# Patient Record
Sex: MALE | Race: OTHER | NOT HISPANIC OR LATINO | URBAN - METROPOLITAN AREA
[De-identification: names, ages, dates, MRNs, and addresses within clinical notes are randomized per-mention and may not be internally consistent; named-entity substitution may affect disease eponyms.]

---

## 2019-02-13 ENCOUNTER — OUTPATIENT (OUTPATIENT)
Dept: OUTPATIENT SERVICES | Facility: HOSPITAL | Age: 44
LOS: 1 days | End: 2019-02-13
Payer: COMMERCIAL

## 2019-02-13 VITALS
WEIGHT: 315 LBS | HEIGHT: 74 IN | DIASTOLIC BLOOD PRESSURE: 86 MMHG | RESPIRATION RATE: 16 BRPM | TEMPERATURE: 99 F | SYSTOLIC BLOOD PRESSURE: 155 MMHG | HEART RATE: 98 BPM | OXYGEN SATURATION: 93 %

## 2019-02-13 DIAGNOSIS — R94.39 ABNORMAL RESULT OF OTHER CARDIOVASCULAR FUNCTION STUDY: ICD-10-CM

## 2019-02-13 DIAGNOSIS — Z98.84 BARIATRIC SURGERY STATUS: Chronic | ICD-10-CM

## 2019-02-13 LAB
ANION GAP SERPL CALC-SCNC: 12 MMOL/L — SIGNIFICANT CHANGE UP (ref 5–17)
BUN SERPL-MCNC: 17 MG/DL — SIGNIFICANT CHANGE UP (ref 7–23)
CALCIUM SERPL-MCNC: 9.4 MG/DL — SIGNIFICANT CHANGE UP (ref 8.4–10.5)
CHLORIDE SERPL-SCNC: 103 MMOL/L — SIGNIFICANT CHANGE UP (ref 96–108)
CO2 SERPL-SCNC: 27 MMOL/L — SIGNIFICANT CHANGE UP (ref 22–31)
CREAT SERPL-MCNC: 0.83 MG/DL — SIGNIFICANT CHANGE UP (ref 0.5–1.3)
GLUCOSE SERPL-MCNC: 106 MG/DL — HIGH (ref 70–99)
HCT VFR BLD CALC: 41.2 % — SIGNIFICANT CHANGE UP (ref 39–50)
HGB BLD-MCNC: 14.2 G/DL — SIGNIFICANT CHANGE UP (ref 13–17)
MCHC RBC-ENTMCNC: 28.9 PG — SIGNIFICANT CHANGE UP (ref 27–34)
MCHC RBC-ENTMCNC: 34.3 GM/DL — SIGNIFICANT CHANGE UP (ref 32–36)
MCV RBC AUTO: 84.1 FL — SIGNIFICANT CHANGE UP (ref 80–100)
PLATELET # BLD AUTO: 252 K/UL — SIGNIFICANT CHANGE UP (ref 150–400)
POTASSIUM SERPL-MCNC: 4 MMOL/L — SIGNIFICANT CHANGE UP (ref 3.5–5.3)
POTASSIUM SERPL-SCNC: 4 MMOL/L — SIGNIFICANT CHANGE UP (ref 3.5–5.3)
RBC # BLD: 4.9 M/UL — SIGNIFICANT CHANGE UP (ref 4.2–5.8)
RBC # FLD: 13.5 % — SIGNIFICANT CHANGE UP (ref 10.3–14.5)
SODIUM SERPL-SCNC: 142 MMOL/L — SIGNIFICANT CHANGE UP (ref 135–145)
WBC # BLD: 10.3 K/UL — SIGNIFICANT CHANGE UP (ref 3.8–10.5)
WBC # FLD AUTO: 10.3 K/UL — SIGNIFICANT CHANGE UP (ref 3.8–10.5)

## 2019-02-13 PROCEDURE — C1887: CPT

## 2019-02-13 PROCEDURE — 93010 ELECTROCARDIOGRAM REPORT: CPT

## 2019-02-13 PROCEDURE — 93005 ELECTROCARDIOGRAM TRACING: CPT

## 2019-02-13 PROCEDURE — 80048 BASIC METABOLIC PNL TOTAL CA: CPT

## 2019-02-13 PROCEDURE — 93458 L HRT ARTERY/VENTRICLE ANGIO: CPT | Mod: 26,GC

## 2019-02-13 PROCEDURE — 85027 COMPLETE CBC AUTOMATED: CPT

## 2019-02-13 PROCEDURE — 99152 MOD SED SAME PHYS/QHP 5/>YRS: CPT

## 2019-02-13 PROCEDURE — C1894: CPT

## 2019-02-13 PROCEDURE — C1769: CPT

## 2019-02-13 PROCEDURE — 99152 MOD SED SAME PHYS/QHP 5/>YRS: CPT | Mod: GC

## 2019-02-13 PROCEDURE — 93458 L HRT ARTERY/VENTRICLE ANGIO: CPT

## 2019-02-13 NOTE — H&P CARDIOLOGY - FAMILY HISTORY
Father  Still living? Yes, Estimated age: Age Unknown  Family history of early CAD, Age at diagnosis: Age Unknown     Sibling  Still living? Unknown  Family history of MI (myocardial infarction), Age at diagnosis: Age Unknown     Uncle  Still living? No  Family history of MI (myocardial infarction), Age at diagnosis: Age Unknown

## 2019-02-13 NOTE — H&P CARDIOLOGY - PMH
Gastric bypass status for obesity  s/p Lap band  Hyperlipidemia    Hypertension    Obesity    Sleep apnea

## 2019-02-13 NOTE — H&P CARDIOLOGY - HISTORY OF PRESENT ILLNESS
This is a 42y/o  male with PMHX of HTN, Hyperlipidemia , Obesity ( Lap band 10years ago) , Sleep Apnea and strong family hx CAD. Pt Cardiologist is Dr. Alex Jhaveri and now referred for [facility] due to strong family hx of heart disease.

## 2022-08-16 NOTE — H&P CARDIOLOGY - SURGICAL SITE INCISION
no PAST MEDICAL HISTORY:  Coronary artery disease     Myocardial infarction     Pacemaker      PAST MEDICAL HISTORY:  Coronary artery disease cardiac stents X7 2004- 2008    Hydrocele     Myocardial infarction     Pacemaker medtronic serial [id number] 2011     PAST MEDICAL HISTORY:  Coronary artery disease cardiac stents X7 2004- 2008    Hydrocele     Hyperlipidemia     Hypertension     Myocardial infarction     Pacemaker medtronic serial [id number] 2011

## 2022-11-15 ENCOUNTER — INPATIENT (INPATIENT)
Facility: HOSPITAL | Age: 47
LOS: 7 days | Discharge: HOME | End: 2022-11-23
Attending: HOSPITALIST | Admitting: HOSPITALIST

## 2022-11-15 VITALS
SYSTOLIC BLOOD PRESSURE: 169 MMHG | TEMPERATURE: 99 F | HEART RATE: 109 BPM | RESPIRATION RATE: 20 BRPM | OXYGEN SATURATION: 95 % | DIASTOLIC BLOOD PRESSURE: 107 MMHG

## 2022-11-15 DIAGNOSIS — E66.01 MORBID (SEVERE) OBESITY DUE TO EXCESS CALORIES: ICD-10-CM

## 2022-11-15 DIAGNOSIS — Z29.9 ENCOUNTER FOR PROPHYLACTIC MEASURES, UNSPECIFIED: ICD-10-CM

## 2022-11-15 DIAGNOSIS — F10.10 ALCOHOL ABUSE, UNCOMPLICATED: ICD-10-CM

## 2022-11-15 DIAGNOSIS — Z98.84 BARIATRIC SURGERY STATUS: Chronic | ICD-10-CM

## 2022-11-15 DIAGNOSIS — R06.02 SHORTNESS OF BREATH: ICD-10-CM

## 2022-11-15 DIAGNOSIS — I50.21 ACUTE SYSTOLIC (CONGESTIVE) HEART FAILURE: ICD-10-CM

## 2022-11-15 DIAGNOSIS — I10 ESSENTIAL (PRIMARY) HYPERTENSION: ICD-10-CM

## 2022-11-15 DIAGNOSIS — Z91.89 OTHER SPECIFIED PERSONAL RISK FACTORS, NOT ELSEWHERE CLASSIFIED: ICD-10-CM

## 2022-11-15 PROBLEM — E66.9 OBESITY, UNSPECIFIED: Chronic | Status: ACTIVE | Noted: 2019-02-13

## 2022-11-15 PROBLEM — G47.30 SLEEP APNEA, UNSPECIFIED: Chronic | Status: ACTIVE | Noted: 2019-02-13

## 2022-11-15 PROBLEM — E78.5 HYPERLIPIDEMIA, UNSPECIFIED: Chronic | Status: ACTIVE | Noted: 2019-02-13

## 2022-11-15 LAB
ALBUMIN SERPL ELPH-MCNC: 3.8 G/DL — SIGNIFICANT CHANGE UP (ref 3.3–5)
ALP SERPL-CCNC: 96 U/L — SIGNIFICANT CHANGE UP (ref 40–120)
ALT FLD-CCNC: 18 U/L — SIGNIFICANT CHANGE UP (ref 4–41)
ANION GAP SERPL CALC-SCNC: 10 MMOL/L — SIGNIFICANT CHANGE UP (ref 7–14)
AST SERPL-CCNC: 19 U/L — SIGNIFICANT CHANGE UP (ref 4–40)
B PERT DNA SPEC QL NAA+PROBE: SIGNIFICANT CHANGE UP
B PERT+PARAPERT DNA PNL SPEC NAA+PROBE: SIGNIFICANT CHANGE UP
BASOPHILS # BLD AUTO: 0.05 K/UL — SIGNIFICANT CHANGE UP (ref 0–0.2)
BASOPHILS NFR BLD AUTO: 0.6 % — SIGNIFICANT CHANGE UP (ref 0–2)
BILIRUB SERPL-MCNC: 1.2 MG/DL — SIGNIFICANT CHANGE UP (ref 0.2–1.2)
BORDETELLA PARAPERTUSSIS (RAPRVP): SIGNIFICANT CHANGE UP
BUN SERPL-MCNC: 13 MG/DL — SIGNIFICANT CHANGE UP (ref 7–23)
C PNEUM DNA SPEC QL NAA+PROBE: SIGNIFICANT CHANGE UP
CALCIUM SERPL-MCNC: 9.1 MG/DL — SIGNIFICANT CHANGE UP (ref 8.4–10.5)
CHLORIDE SERPL-SCNC: 107 MMOL/L — SIGNIFICANT CHANGE UP (ref 98–107)
CO2 SERPL-SCNC: 24 MMOL/L — SIGNIFICANT CHANGE UP (ref 22–31)
CREAT SERPL-MCNC: 0.99 MG/DL — SIGNIFICANT CHANGE UP (ref 0.5–1.3)
EGFR: 95 ML/MIN/1.73M2 — SIGNIFICANT CHANGE UP
EOSINOPHIL # BLD AUTO: 0.42 K/UL — SIGNIFICANT CHANGE UP (ref 0–0.5)
EOSINOPHIL NFR BLD AUTO: 4.7 % — SIGNIFICANT CHANGE UP (ref 0–6)
FLUAV SUBTYP SPEC NAA+PROBE: SIGNIFICANT CHANGE UP
FLUBV RNA SPEC QL NAA+PROBE: SIGNIFICANT CHANGE UP
GLUCOSE SERPL-MCNC: 105 MG/DL — HIGH (ref 70–99)
HADV DNA SPEC QL NAA+PROBE: SIGNIFICANT CHANGE UP
HCOV 229E RNA SPEC QL NAA+PROBE: SIGNIFICANT CHANGE UP
HCOV HKU1 RNA SPEC QL NAA+PROBE: SIGNIFICANT CHANGE UP
HCOV NL63 RNA SPEC QL NAA+PROBE: SIGNIFICANT CHANGE UP
HCOV OC43 RNA SPEC QL NAA+PROBE: SIGNIFICANT CHANGE UP
HCT VFR BLD CALC: 35 % — LOW (ref 39–50)
HGB BLD-MCNC: 10.7 G/DL — LOW (ref 13–17)
HMPV RNA SPEC QL NAA+PROBE: SIGNIFICANT CHANGE UP
HPIV1 RNA SPEC QL NAA+PROBE: SIGNIFICANT CHANGE UP
HPIV2 RNA SPEC QL NAA+PROBE: SIGNIFICANT CHANGE UP
HPIV3 RNA SPEC QL NAA+PROBE: SIGNIFICANT CHANGE UP
HPIV4 RNA SPEC QL NAA+PROBE: SIGNIFICANT CHANGE UP
IANC: 6 K/UL — SIGNIFICANT CHANGE UP (ref 1.8–7.4)
IMM GRANULOCYTES NFR BLD AUTO: 0.3 % — SIGNIFICANT CHANGE UP (ref 0–0.9)
LYMPHOCYTES # BLD AUTO: 1.8 K/UL — SIGNIFICANT CHANGE UP (ref 1–3.3)
LYMPHOCYTES # BLD AUTO: 20.3 % — SIGNIFICANT CHANGE UP (ref 13–44)
M PNEUMO DNA SPEC QL NAA+PROBE: SIGNIFICANT CHANGE UP
MCHC RBC-ENTMCNC: 24.9 PG — LOW (ref 27–34)
MCHC RBC-ENTMCNC: 30.6 GM/DL — LOW (ref 32–36)
MCV RBC AUTO: 81.6 FL — SIGNIFICANT CHANGE UP (ref 80–100)
MONOCYTES # BLD AUTO: 0.58 K/UL — SIGNIFICANT CHANGE UP (ref 0–0.9)
MONOCYTES NFR BLD AUTO: 6.5 % — SIGNIFICANT CHANGE UP (ref 2–14)
NEUTROPHILS # BLD AUTO: 6 K/UL — SIGNIFICANT CHANGE UP (ref 1.8–7.4)
NEUTROPHILS NFR BLD AUTO: 67.6 % — SIGNIFICANT CHANGE UP (ref 43–77)
NRBC # BLD: 0 /100 WBCS — SIGNIFICANT CHANGE UP (ref 0–0)
NRBC # FLD: 0 K/UL — SIGNIFICANT CHANGE UP (ref 0–0)
NT-PROBNP SERPL-SCNC: 2729 PG/ML — HIGH
PLATELET # BLD AUTO: 283 K/UL — SIGNIFICANT CHANGE UP (ref 150–400)
POTASSIUM SERPL-MCNC: 4.1 MMOL/L — SIGNIFICANT CHANGE UP (ref 3.5–5.3)
POTASSIUM SERPL-SCNC: 4.1 MMOL/L — SIGNIFICANT CHANGE UP (ref 3.5–5.3)
PROT SERPL-MCNC: 7.4 G/DL — SIGNIFICANT CHANGE UP (ref 6–8.3)
RAPID RVP RESULT: SIGNIFICANT CHANGE UP
RBC # BLD: 4.29 M/UL — SIGNIFICANT CHANGE UP (ref 4.2–5.8)
RBC # FLD: 15.7 % — HIGH (ref 10.3–14.5)
RSV RNA SPEC QL NAA+PROBE: SIGNIFICANT CHANGE UP
RV+EV RNA SPEC QL NAA+PROBE: SIGNIFICANT CHANGE UP
SARS-COV-2 RNA SPEC QL NAA+PROBE: SIGNIFICANT CHANGE UP
SODIUM SERPL-SCNC: 141 MMOL/L — SIGNIFICANT CHANGE UP (ref 135–145)
TROPONIN T, HIGH SENSITIVITY RESULT: 23 NG/L — SIGNIFICANT CHANGE UP
TROPONIN T, HIGH SENSITIVITY RESULT: 23 NG/L — SIGNIFICANT CHANGE UP
WBC # BLD: 8.88 K/UL — SIGNIFICANT CHANGE UP (ref 3.8–10.5)
WBC # FLD AUTO: 8.88 K/UL — SIGNIFICANT CHANGE UP (ref 3.8–10.5)

## 2022-11-15 PROCEDURE — 93010 ELECTROCARDIOGRAM REPORT: CPT | Mod: NC

## 2022-11-15 PROCEDURE — 71046 X-RAY EXAM CHEST 2 VIEWS: CPT | Mod: 26

## 2022-11-15 PROCEDURE — 71275 CT ANGIOGRAPHY CHEST: CPT | Mod: 26,MA

## 2022-11-15 PROCEDURE — 99285 EMERGENCY DEPT VISIT HI MDM: CPT

## 2022-11-15 PROCEDURE — 99223 1ST HOSP IP/OBS HIGH 75: CPT

## 2022-11-15 RX ORDER — LANOLIN ALCOHOL/MO/W.PET/CERES
3 CREAM (GRAM) TOPICAL AT BEDTIME
Refills: 0 | Status: DISCONTINUED | OUTPATIENT
Start: 2022-11-15 | End: 2022-11-23

## 2022-11-15 RX ORDER — HEPARIN SODIUM 5000 [USP'U]/ML
5000 INJECTION INTRAVENOUS; SUBCUTANEOUS EVERY 12 HOURS
Refills: 0 | Status: DISCONTINUED | OUTPATIENT
Start: 2022-11-15 | End: 2022-11-16

## 2022-11-15 RX ORDER — FUROSEMIDE 40 MG
20 TABLET ORAL ONCE
Refills: 0 | Status: COMPLETED | OUTPATIENT
Start: 2022-11-15 | End: 2022-11-15

## 2022-11-15 RX ORDER — FUROSEMIDE 40 MG
20 TABLET ORAL
Refills: 0 | Status: DISCONTINUED | OUTPATIENT
Start: 2022-11-15 | End: 2022-11-16

## 2022-11-15 RX ORDER — LOSARTAN/HYDROCHLOROTHIAZIDE 100MG-25MG
1 TABLET ORAL
Qty: 0 | Refills: 0 | DISCHARGE

## 2022-11-15 RX ORDER — ACETAMINOPHEN 500 MG
650 TABLET ORAL EVERY 6 HOURS
Refills: 0 | Status: DISCONTINUED | OUTPATIENT
Start: 2022-11-15 | End: 2022-11-23

## 2022-11-15 RX ORDER — ROSUVASTATIN CALCIUM 5 MG/1
1 TABLET ORAL
Qty: 0 | Refills: 0 | DISCHARGE

## 2022-11-15 RX ADMIN — Medication 20 MILLIGRAM(S): at 22:19

## 2022-11-15 RX ADMIN — Medication 20 MILLIGRAM(S): at 14:30

## 2022-11-15 NOTE — ED PROVIDER NOTE - OBJECTIVE STATEMENT
46 year old male with PMH HTN, obesity presents with shortness of breath, chronic in nature after COVID infection last year but acutely worse the past day with rest and exertion. States yesterday he had episode of palpitations and squeezing chest pain while resting in bed that lasted several minutes and then resolved on its own. Denies fever, chills, worsening cough, chest pain presently, abdominal pain, nausea, vomiting, diarrhea. Nonsmoker, no history of clots.

## 2022-11-15 NOTE — H&P ADULT - NSHPPHYSICALEXAM_GEN_ALL_CORE
PHYSICAL EXAM:  VITALS: Vital Signs Last 24 Hrs  T(C): 37.2 (15 Nov 2022 18:40), Max: 37.2 (15 Nov 2022 18:40)  T(F): 98.9 (15 Nov 2022 18:40), Max: 98.9 (15 Nov 2022 18:40)  HR: 97 (15 Nov 2022 18:40) (97 - 109)  BP: 151/98 (15 Nov 2022 18:40) (151/98 - 169/107)  BP(mean): --  RR: 19 (15 Nov 2022 18:40) (19 - 20)  SpO2: 99% (15 Nov 2022 18:40) (95% - 99%)    Parameters below as of 15 Nov 2022 18:40  Patient On (Oxygen Delivery Method): room air      GENERAL: NAD, well-developed, well-nourished, morbidly obese  HEAD:  Atraumatic, Normocephalic  EYES: EOMI, PERRL, conjunctiva and sclera clear  ENT: Moist Mucus Membranes present, no ulcers appreciated  NECK: Supple, difficult to appreciate JVD  CHEST/LUNG: Clear to auscultation bilaterally; No wheezes, rales or rhonchi, no accessory muscle use, difficult exam due to body habitus  HEART: Regular rate and rhythm; No murmurs, rubs, or gallops, (+)S1, S2  ABDOMEN: Soft, Nontender, Nondistended; Normal Bowel sounds   EXTREMITIES:  2+ Peripheral Pulses, No clubbing, cyanosis, + LE edema b/l +2  PSYCH: normal mood and affect  NEUROLOGY: AAOx3, non-focal  SKIN: No rashes or lesions

## 2022-11-15 NOTE — H&P ADULT - ASSESSMENT
45 y/o M with pmhx of HTN, presented to the ED for worsening SOB. Unclear etiology at this time however suspicion for newly diagnosed acute CHF exacerbation which could be the cause of his symptoms. Will need further work up. Admit for acute CHF exacerbation.

## 2022-11-15 NOTE — H&P ADULT - NSHPLABSRESULTS_GEN_ALL_CORE
10.7   8.88  )-----------( 283      ( 15 Nov 2022 12:00 )             35.0       11-15    141  |  107  |  13  ----------------------------<  105<H>  4.1   |  24  |  0.99    Ca    9.1      15 Nov 2022 12:00    TPro  7.4  /  Alb  3.8  /  TBili  1.2  /  DBili  x   /  AST  19  /  ALT  18  /  AlkPhos  96  11-15

## 2022-11-15 NOTE — H&P ADULT - PROBLEM SELECTOR PLAN 3
- advised to stop drinking  - no signs of acute withdrawal, calm appearing at bedside - advised to stop drinking  - no signs of acute withdrawal, calm appearing at bedside, no tremors or signs of agitation/anxiety, last drank 1 pint of liquor 2 days ago  - no indication for CIWA protocol at this time

## 2022-11-15 NOTE — ED PROVIDER NOTE - PHYSICAL EXAMINATION
PHYSICAL EXAM:  CONSTITUTIONAL: Well appearing, obese, awake, alert, oriented to person, place, time/situation and in no apparent distress.  HEAD: Atraumatic  EYES: Clear bilaterally, pupils equal, round and reactive to light.  ENMT: Airway patent, Nasal mucosa clear. Mouth with normal mucosa. Uvula is midline.   CARDIAC: Normal rate, regular rhythm. +S1/S2. No murmurs, rubs or gallops.  RESPIRATORY: Breathing unlabored. Breath sounds clear and equal bilaterally. Satting 99% on room air, ambulatory.  ABDOMEN:  Soft, nontender, nondistended. No rebound tenderness or guarding.  NEUROLOGICAL: Alert and oriented, no focal deficits, no motor or sensory deficits.   MSK: No clubbing, cyanosis, or edema. Full range of motion of all extremities. No tenderness to palpation.   SKIN: Skin warm and dry. No evidence of rashes or lesions.

## 2022-11-15 NOTE — ED ADULT NURSE NOTE - OBJECTIVE STATEMENT
Pt walked in c/o SOB and chest discomfort since last night unable to sleep denies any significant pmh 96 % RA on pulse ox in no acute distress karen ferrer You can access the FollowMyHealth Patient Portal offered by [facility] by registering at the following website: [email or web address]. By joining [facility]’s FollowMyHealth portal, you will also be able to view your health information using other applications (apps) compatible with our system.

## 2022-11-15 NOTE — ED PROVIDER NOTE - NSICDXFAMILYHX_GEN_ALL_CORE_FT
FAMILY HISTORY:  Father  Still living? Yes, Estimated age: Age Unknown  Family history of early CAD, Age at diagnosis: Age Unknown    Sibling  Still living? Unknown  Family history of MI (myocardial infarction), Age at diagnosis: Age Unknown    Uncle  Still living? No  Family history of MI (myocardial infarction), Age at diagnosis: Age Unknown

## 2022-11-15 NOTE — H&P ADULT - NSHPREVIEWOFSYSTEMS_GEN_ALL_CORE
REVIEW OF SYSTEMS:    CONSTITUTIONAL: No weakness, fevers or chills  EYES/ENT: No visual changes;  No dysphagia; No sore throat; No rhinorrhea; No sinus pain/pressure  NECK: No pain or stiffness  RESPIRATORY: No cough, wheezing, hemoptysis; + shortness of breath with and without exertion  CARDIOVASCULAR: + chest pain, no palpitations; + lower extremity edema b/l  GASTROINTESTINAL: No abdominal or epigastric pain. No nausea, vomiting, or hematemesis; No diarrhea or constipation. No melena or hematochezia.  GENITOURINARY: No dysuria, frequency or hematuria  NEUROLOGICAL: No numbness or weakness  MSK: ambulates without assistance  SKIN: No itching, burning, rashes, or lesions   All other review of systems is negative unless indicated above.

## 2022-11-15 NOTE — ED ADULT TRIAGE NOTE - CHIEF COMPLAINT QUOTE
Pt c/o of SOB and chest pain that started last night. Denies medical problems. Left sided chest pain, non-radiating. Noted to be breathing labored in triage, saturating 93% on RA. Patient noted to have difficulty completing full sentences. Denies taking medications or having medical problems.

## 2022-11-15 NOTE — H&P ADULT - PROBLEM SELECTOR PLAN 1
Assessment:  - patient presented for worsening of shortness of breath with exertion  - BNP 2749  - CXR shows new pulmonary congestion  - CTA is neg for PE  - troponins neg x2, EKG shows NSR no ST elevations or TWI    Plan:  - will obtain echo  - start diuresis with lasix 20mg BID  - daily weights, monitor I/O  - cardiology consult  - fluid restrict 1L/day, DASH diet  - concerning for ETOH cardiomyopathy?

## 2022-11-15 NOTE — H&P ADULT - HISTORY OF PRESENT ILLNESS
This is a 45 y/o M with pmhx of HTN, presented to the ED for worsening SOB. The patient had covid19 6 months ago, since then has been having chronic SOB. The patient reported chronic SOB which had worsened in the last month. He reported dyspnea with rest and exertion. Denies orthopnea. He did not know about having leg swelling recently until someone in the ED told him. The patient had seen a urgent care 3 months ago fro the symptoms and was told that it is likely post covid symptoms but the patient did not believe it. The patient denies hx of cardiac disease. Today the patient had chest pain which woke him up, which is why he presented to the ED. He said that the chest pain is described as "heart in his mouth" but denies pressure-like pains in the chest. Located in the sternal area, does not radiate. Denies chest pain at bedside. Chest pain does not worsen with exertion.    Does not take any medications at home.

## 2022-11-15 NOTE — ED PROVIDER NOTE - NSICDXPASTMEDICALHX_GEN_ALL_CORE_FT
PAST MEDICAL HISTORY:  Gastric bypass status for obesity s/p Lap band    Hyperlipidemia     Hypertension     Obesity     Sleep apnea

## 2022-11-15 NOTE — ED PROVIDER NOTE - NS ED ROS FT
ROS:  -Constitutional: Denies fever  -Head: Denies headache  -Eyes: Denies blurry vision  -Cardiovascular: Denies chest pain   -Pulmonary: +shortness of breath  -Gastrointestinal: Denies nausea or diarrhea  -Genitourinary: Denies dysuria  -Skin: Denies new rashes  -Neuro: Denies numbness or tingling

## 2022-11-15 NOTE — ED PROVIDER NOTE - CLINICAL SUMMARY MEDICAL DECISION MAKING FREE TEXT BOX
Jeniffer Ivory DO PGY-2  46 year old male with PMH HTN, obesity presents with shortness of breath, chronic in nature after COVID infection last year but acutely worse the past day with rest and exertion. States yesterday he had episode of palpitations and squeezing chest pain while resting in bed that lasted several minutes and then resolved on its own. EKG nonischemic, hemodynamically stable and satting 99% on room air (ambulatory). Evaluating for ACS, pneumonia, viral syndrome, hematologic/electrolyte abnormalities. Will obtain labs, CXR, swab, cardiac monitoring, and re-evaluate for dispo.

## 2022-11-15 NOTE — ED PROVIDER NOTE - ATTENDING CONTRIBUTION TO CARE
Pt was seen and evaluated by me. Pt is a 47 y/o male with PMHx of HTN and obesity who presented to the ED for SOB X 1 year, worse over the past day. Pt states after having COVID having worsening SOB and then he got COVID again and had a cough that was improving but worse SOB since yesterday. Pt admits to worsening SOB with minimal exertion. Pt denies any fever, chills, nausea, vomiting, or abd pain.   VITALS: Vitals have been reviewed.  GEN APPEARANCE: Alert and cooperative, non-toxic appearing and in NAD  HEAD: Atraumatic, normocephalic.   EYES: PERRL, EOMI.   EARS: Gross hearing intact.   NOSE: No nasal discharge.   THROAT: MMM.   NECK: Supple, no lymphadenopathy  CV: RRR, S1S2, no c/r/m/g. No cyanosis or pallor. Extremities warm, well perfused.   LUNGS: CTAB. No wheezing. No rales. No rhonchi. No diminished breath sounds.   ABDOMEN: Soft, NTND. No guarding or rebound.   MSK/EXT: Spine appears normal, no spine point tenderness. No calf tenderness.   NEURO: Alert, follows commands. Speech normal. Sensation and motor normal x4 extremities.   SKIN: Normal color for race, warm, dry and intact. No evidence of rash.  47 y/o male with PMHx of HTN and obesity who presented to the ED for SOB X 1 year, worse over the past day.   Concern for PNA, COVID, CHF, ACS  Labs, EKG, CXR

## 2022-11-16 LAB
ALBUMIN SERPL ELPH-MCNC: 4.1 G/DL — SIGNIFICANT CHANGE UP (ref 3.3–5)
ALP SERPL-CCNC: 101 U/L — SIGNIFICANT CHANGE UP (ref 40–120)
ALT FLD-CCNC: 17 U/L — SIGNIFICANT CHANGE UP (ref 4–41)
ANION GAP SERPL CALC-SCNC: 13 MMOL/L — SIGNIFICANT CHANGE UP (ref 7–14)
AST SERPL-CCNC: 20 U/L — SIGNIFICANT CHANGE UP (ref 4–40)
BASE EXCESS BLDV CALC-SCNC: 4.1 MMOL/L — HIGH (ref -2–3)
BASOPHILS # BLD AUTO: 0.05 K/UL — SIGNIFICANT CHANGE UP (ref 0–0.2)
BASOPHILS NFR BLD AUTO: 0.6 % — SIGNIFICANT CHANGE UP (ref 0–2)
BILIRUB SERPL-MCNC: 1.5 MG/DL — HIGH (ref 0.2–1.2)
BLOOD GAS VENOUS COMPREHENSIVE RESULT: SIGNIFICANT CHANGE UP
BUN SERPL-MCNC: 13 MG/DL — SIGNIFICANT CHANGE UP (ref 7–23)
CALCIUM SERPL-MCNC: 9.5 MG/DL — SIGNIFICANT CHANGE UP (ref 8.4–10.5)
CHLORIDE BLDV-SCNC: 105 MMOL/L — SIGNIFICANT CHANGE UP (ref 96–108)
CHLORIDE SERPL-SCNC: 103 MMOL/L — SIGNIFICANT CHANGE UP (ref 98–107)
CO2 BLDV-SCNC: 29.8 MMOL/L — HIGH (ref 22–26)
CO2 SERPL-SCNC: 25 MMOL/L — SIGNIFICANT CHANGE UP (ref 22–31)
CREAT SERPL-MCNC: 0.99 MG/DL — SIGNIFICANT CHANGE UP (ref 0.5–1.3)
EGFR: 95 ML/MIN/1.73M2 — SIGNIFICANT CHANGE UP
EOSINOPHIL # BLD AUTO: 0.43 K/UL — SIGNIFICANT CHANGE UP (ref 0–0.5)
EOSINOPHIL NFR BLD AUTO: 5.4 % — SIGNIFICANT CHANGE UP (ref 0–6)
GAS PNL BLDV: 138 MMOL/L — SIGNIFICANT CHANGE UP (ref 136–145)
GLUCOSE BLDV-MCNC: 114 MG/DL — HIGH (ref 70–99)
GLUCOSE SERPL-MCNC: 113 MG/DL — HIGH (ref 70–99)
HCO3 BLDV-SCNC: 28 MMOL/L — SIGNIFICANT CHANGE UP (ref 22–29)
HCT VFR BLD CALC: 35.9 % — LOW (ref 39–50)
HCT VFR BLDA CALC: 35 % — LOW (ref 39–51)
HGB BLD CALC-MCNC: 11.5 G/DL — LOW (ref 13–17)
HGB BLD-MCNC: 11.1 G/DL — LOW (ref 13–17)
IANC: 5.27 K/UL — SIGNIFICANT CHANGE UP (ref 1.8–7.4)
IMM GRANULOCYTES NFR BLD AUTO: 0.1 % — SIGNIFICANT CHANGE UP (ref 0–0.9)
LACTATE BLDV-MCNC: 0.9 MMOL/L — SIGNIFICANT CHANGE UP (ref 0.5–2)
LYMPHOCYTES # BLD AUTO: 1.75 K/UL — SIGNIFICANT CHANGE UP (ref 1–3.3)
LYMPHOCYTES # BLD AUTO: 21.9 % — SIGNIFICANT CHANGE UP (ref 13–44)
MAGNESIUM SERPL-MCNC: 1.9 MG/DL — SIGNIFICANT CHANGE UP (ref 1.6–2.6)
MCHC RBC-ENTMCNC: 24.9 PG — LOW (ref 27–34)
MCHC RBC-ENTMCNC: 30.9 GM/DL — LOW (ref 32–36)
MCV RBC AUTO: 80.7 FL — SIGNIFICANT CHANGE UP (ref 80–100)
MONOCYTES # BLD AUTO: 0.49 K/UL — SIGNIFICANT CHANGE UP (ref 0–0.9)
MONOCYTES NFR BLD AUTO: 6.1 % — SIGNIFICANT CHANGE UP (ref 2–14)
NEUTROPHILS # BLD AUTO: 5.27 K/UL — SIGNIFICANT CHANGE UP (ref 1.8–7.4)
NEUTROPHILS NFR BLD AUTO: 65.9 % — SIGNIFICANT CHANGE UP (ref 43–77)
NRBC # BLD: 0 /100 WBCS — SIGNIFICANT CHANGE UP (ref 0–0)
NRBC # FLD: 0 K/UL — SIGNIFICANT CHANGE UP (ref 0–0)
PCO2 BLDV: 41 MMHG — LOW (ref 42–55)
PH BLDV: 7.45 — HIGH (ref 7.32–7.43)
PHOSPHATE SERPL-MCNC: 3.6 MG/DL — SIGNIFICANT CHANGE UP (ref 2.5–4.5)
PLATELET # BLD AUTO: 304 K/UL — SIGNIFICANT CHANGE UP (ref 150–400)
PO2 BLDV: 67 MMHG — SIGNIFICANT CHANGE UP
POTASSIUM BLDV-SCNC: 4.1 MMOL/L — SIGNIFICANT CHANGE UP (ref 3.5–5.1)
POTASSIUM SERPL-MCNC: 4.1 MMOL/L — SIGNIFICANT CHANGE UP (ref 3.5–5.3)
POTASSIUM SERPL-SCNC: 4.1 MMOL/L — SIGNIFICANT CHANGE UP (ref 3.5–5.3)
PROT SERPL-MCNC: 7.4 G/DL — SIGNIFICANT CHANGE UP (ref 6–8.3)
RBC # BLD: 4.45 M/UL — SIGNIFICANT CHANGE UP (ref 4.2–5.8)
RBC # FLD: 15.6 % — HIGH (ref 10.3–14.5)
SAO2 % BLDV: 94.1 % — SIGNIFICANT CHANGE UP
SODIUM SERPL-SCNC: 141 MMOL/L — SIGNIFICANT CHANGE UP (ref 135–145)
WBC # BLD: 8 K/UL — SIGNIFICANT CHANGE UP (ref 3.8–10.5)
WBC # FLD AUTO: 8 K/UL — SIGNIFICANT CHANGE UP (ref 3.8–10.5)

## 2022-11-16 PROCEDURE — 93306 TTE W/DOPPLER COMPLETE: CPT | Mod: 26

## 2022-11-16 PROCEDURE — 99233 SBSQ HOSP IP/OBS HIGH 50: CPT

## 2022-11-16 PROCEDURE — 99223 1ST HOSP IP/OBS HIGH 75: CPT

## 2022-11-16 RX ORDER — FUROSEMIDE 40 MG
40 TABLET ORAL
Refills: 0 | Status: DISCONTINUED | OUTPATIENT
Start: 2022-11-16 | End: 2022-11-22

## 2022-11-16 RX ORDER — LOSARTAN POTASSIUM 100 MG/1
25 TABLET, FILM COATED ORAL DAILY
Refills: 0 | Status: DISCONTINUED | OUTPATIENT
Start: 2022-11-16 | End: 2022-11-21

## 2022-11-16 RX ORDER — ENOXAPARIN SODIUM 100 MG/ML
60 INJECTION SUBCUTANEOUS ONCE
Refills: 0 | Status: COMPLETED | OUTPATIENT
Start: 2022-11-16 | End: 2022-11-16

## 2022-11-16 RX ORDER — METOPROLOL TARTRATE 50 MG
25 TABLET ORAL DAILY
Refills: 0 | Status: DISCONTINUED | OUTPATIENT
Start: 2022-11-16 | End: 2022-11-17

## 2022-11-16 RX ADMIN — Medication 40 MILLIGRAM(S): at 17:58

## 2022-11-16 RX ADMIN — ENOXAPARIN SODIUM 60 MILLIGRAM(S): 100 INJECTION SUBCUTANEOUS at 18:21

## 2022-11-16 RX ADMIN — Medication 20 MILLIGRAM(S): at 07:04

## 2022-11-16 RX ADMIN — LOSARTAN POTASSIUM 25 MILLIGRAM(S): 100 TABLET, FILM COATED ORAL at 17:58

## 2022-11-16 RX ADMIN — HEPARIN SODIUM 5000 UNIT(S): 5000 INJECTION INTRAVENOUS; SUBCUTANEOUS at 07:04

## 2022-11-16 RX ADMIN — Medication 25 MILLIGRAM(S): at 17:58

## 2022-11-16 NOTE — CONSULT NOTE ADULT - SUBJECTIVE AND OBJECTIVE BOX
Patient seen and evaluated at bedside    Chief Complaint:    HPI:  This is a 45 y/o M with pmhx of HTN, presented to the ED for worsening SOB. The patient had covid19 6 months ago, since then has been having chronic SOB. The patient reported chronic SOB which had worsened in the last month. He reported dyspnea with rest and exertion. Denies orthopnea. He did not know about having leg swelling recently until someone in the ED told him. The patient had seen a urgent care 3 months ago fro the symptoms and was told that it is likely post covid symptoms but the patient did not believe it. The patient denies hx of cardiac disease. Today the patient had chest pain which woke him up, which is why he presented to the ED. He said that the chest pain is described as "heart in his mouth" but denies pressure-like pains in the chest. Located in the sternal area, does not radiate. Denies chest pain at bedside. Chest pain does not worsen with exertion.    Does not take any medications at home.   (15 Nov 2022 23:29)      PMHx:   Obesity    CAD (coronary artery disease)    Hyperlipidemia    Gastric bypass status for obesity    Sleep apnea    Hypertension        PSHx:   H/O gastric bypass        Allergies:  amoxicillin (Rash)  penicillin (Rash)      Home Meds:    Current Medications:   acetaminophen     Tablet .. 650 milliGRAM(s) Oral every 6 hours PRN  furosemide   Injectable 20 milliGRAM(s) IV Push two times a day  melatonin 3 milliGRAM(s) Oral at bedtime PRN      FAMILY HISTORY:  Family history of early CAD (Father)    Family history of MI (myocardial infarction) (Sibling, Uncle)        Social History:  Smoking History:  Alcohol Use:  Drug Use:    REVIEW OF SYSTEMS:  Constitutional:     [ ] negative [ ] fevers [ ] chills [ ] weight loss [ ] weight gain  HEENT:                  [ ] negative [ ] dry eyes [ ] eye irritation [ ] postnasal drip [ ] nasal congestion  CV:                         [ ] negative  [ ] chest pain [ ] orthopnea [ ] palpitations [ ] murmur  Resp:                     [ ] negative [ ] cough [ ] shortness of breath [ ] dyspnea [ ] wheezing [ ] sputum [ ]hemoptysis  GI:                          [ ] negative [ ] nausea [ ] vomiting [ ] diarrhea [ ] constipation [ ] abd pain [ ] dysphagia   :                        [ ] negative [ ] dysuria [ ] nocturia [ ] hematuria [ ] increased urinary frequency  Musculoskeletal: [ ] negative [ ] back pain [ ] myalgias [ ] arthralgias [ ] fracture  Skin:                       [ ] negative [ ] rash [ ] itch  Neurological:        [ ] negative [ ] headache [ ] dizziness [ ] syncope [ ] weakness [ ] numbness  Psychiatric:           [ ] negative [ ] anxiety [ ] depression  Endocrine:            [ ] negative [ ] diabetes [ ] thyroid problem  Heme/Lymph:      [ ] negative [ ] anemia [ ] bleeding problem  Allergic/Immune: [ ] negative [ ] itchy eyes [ ] nasal discharge [ ] hives [ ] angioedema    [ ] All other systems negative  [ ] Unable to assess ROS due to      Physical Exam:  T(F): 97.7 (11-16), Max: 98.9 (11-15)  HR: 94 (11-16) (89 - 101)  BP: 161/113 (11-16) (144/78 - 161/113)  RR: 20 (11-16)  SpO2: 98% (11-16)  GENERAL: No acute distress, well-developed  HEAD:  Atraumatic, Normocephalic  ENT: EOMI, PERRLA, conjunctiva and sclera clear, Neck supple, No JVD, moist mucosa  CHEST/LUNG: Clear to auscultation bilaterally; No wheeze, equal breath sounds bilaterally   BACK: No spinal tenderness  HEART: Regular rate and rhythm; No murmurs, rubs, or gallops  ABDOMEN: Soft, Nontender, Nondistended; Bowel sounds present  EXTREMITIES:  No clubbing, cyanosis, or edema  PSYCH: Nl behavior, nl affect  NEUROLOGY: AAOx3, non-focal, cranial nerves intact  SKIN: Normal color, No rashes or lesions  LINES:    Cardiovascular Diagnostic Testing:    ECG: Personally reviewed:    Echo: Personally reviewed:    Stress Testing:    Cath:    Imaging:    CXR: Personally reviewed    Labs: Personally reviewed                        11.1   8.00  )-----------( 304      ( 16 Nov 2022 10:17 )             35.9     11-16    141  |  103  |  13  ----------------------------<  113<H>  4.1   |  25  |  0.99    Ca    9.5      16 Nov 2022 10:17  Phos  3.6     11-16  Mg     1.90     11-16    TPro  7.4  /  Alb  4.1  /  TBili  1.5<H>  /  DBili  x   /  AST  20  /  ALT  17  /  AlkPhos  101  11-16      Serum Pro-Brain Natriuretic Peptide: 2729 pg/mL (11-15 @ 12:00)         Patient seen and evaluated at bedside    Chief Complaint:    HPI:  This is a 47 y/o M current smoker with PMHx of HTN, HLD, presented to the ED for worsening SOB. The patient had covid19 6 months ago, since then has been having chronic SOB. The patient reported chronic SOB which had worsened in the last month. He reported dyspnea with rest and exertion. Denies orthopnea. He did not know about having leg swelling recently until someone in the ED told him. The patient had seen a urgent care 3 months ago fro the symptoms and was told that it is likely post covid symptoms but the patient did not believe it. The patient denies hx of cardiac disease. Today the patient had chest pain which woke him up, which is why he presented to the ED. He said that the chest pain is described as "heart in his mouth" but denies pressure-like pains in the chest. Located in the sternal area, does not radiate. Denies chest pain at bedside. Chest pain does not worsen with exertion. Does not take any medications at home.     Cardiology consulted for new heart failure. The patient reports shortness of breath and cough at bedside. Denies chest pain. Denies recent weight gain. Not currently taking medications, although took some aspirin last week for musculoskeletal pain. Family history is significant for an uncle with heart attack in 60s and a brother who  in his 40s from unknown cardiac causes. The patient currently drinks alcohol, estimates 1 pint 3x per week of liquor.       PMHx:   Obesity    CAD (coronary artery disease)    Hyperlipidemia    Gastric bypass status for obesity    Sleep apnea    Hypertension      PSHx:   H/O gastric bypass      Allergies:  amoxicillin (Rash)  penicillin (Rash)      Home Meds:    Current Medications:   acetaminophen     Tablet .. 650 milliGRAM(s) Oral every 6 hours PRN  furosemide   Injectable 20 milliGRAM(s) IV Push two times a day  melatonin 3 milliGRAM(s) Oral at bedtime PRN      FAMILY HISTORY:  Family history of early CAD (Father)    Family history of MI (myocardial infarction) (Sibling, Uncle)        Social History:   Smoking History: 1/2 pack per week  Alcohol Use: 1 pint 3x per week  Drug Use: none    REVIEW OF SYSTEMS:  Constitutional:     [x ] negative [ ] fevers [ ] chills [ ] weight loss [ ] weight gain  HEENT:                  [x ] negative [ ] dry eyes [ ] eye irritation [ ] postnasal drip [ ] nasal congestion  CV:                         [ x] negative  [ ] chest pain [ ] orthopnea [ ] palpitations [ ] murmur  Resp:                     [ ] negative [x ] cough [x ] shortness of breath [ ] dyspnea [ ] wheezing [ ] sputum [ ]hemoptysis  GI:                          [x ] negative [ ] nausea [ ] vomiting [ ] diarrhea [ ] constipation [ ] abd pain [ ] dysphagia   :                        [x ] negative [ ] dysuria [ ] nocturia [ ] hematuria [ ] increased urinary frequency  Musculoskeletal: [x ] negative [ ] back pain [ ] myalgias [ ] arthralgias [ ] fracture  Skin:                       [x ] negative [ ] rash [ ] itch  Neurological:        [x ] negative [ ] headache [ ] dizziness [ ] syncope [ ] weakness [ ] numbness  Psychiatric:           [x ] negative [ ] anxiety [ ] depression  Endocrine:            [x ] negative [ ] diabetes [ ] thyroid problem  Heme/Lymph:      [x ] negative [ ] anemia [ ] bleeding problem  Allergic/Immune: [x ] negative [ ] itchy eyes [ ] nasal discharge [ ] hives [ ] angioedema    [ ] All other systems negative  [ ] Unable to assess ROS due to      Physical Exam:  T(F): 97.7 (-), Max: 98.9 (-15)  HR: 94 () (89 - 101)  BP: 161/113 (-) (144/78 - 161/113)  RR: 20 (-16)  SpO2: 98% (-)  GENERAL: No acute distress, well-developed  HEAD:  Atraumatic, Normocephalic  ENT: EOMI, PERRLA, conjunctiva and sclera clear, Neck supple, No JVD, moist mucosa  CHEST/LUNG: Decreased breath sounds,   BACK: No spinal tenderness  HEART: tachycardic  ABDOMEN: Soft, Nontender, Nondistended; Bowel sounds present  EXTREMITIES: 3+ pitting edema up to thighs  PSYCH: Nl behavior, nl affect  NEUROLOGY: AAOx3, non-focal, cranial nerves intact  SKIN: Normal color, No rashes or lesions      Cardiovascular Diagnostic Testing:    ECG: Personally reviewed:    Echo: Personally reviewed:    Stress Testing:    Cath:    Imaging:    CXR: Personally reviewed    Labs: Personally reviewed                        11.   8.00  )-----------( 304      ( 2022 10:17 )             35.9     11-16    141  |  103  |  13  ----------------------------<  113<H>  4.1   |  25  |  0.99    Ca    9.5      2022 10:17  Phos  3.6       Mg     1.90         TPro  7.4  /  Alb  4.1  /  TBili  1.5<H>  /  DBili  x   /  AST  20  /  ALT  17  /  AlkPhos  101        Serum Pro-Brain Natriuretic Peptide: 2729 pg/mL (11-15 @ 12:00)

## 2022-11-16 NOTE — PROGRESS NOTE ADULT - ASSESSMENT
46M with PMH of HTN, presented to the ED for worsening SOB, diagnosed acute CHF exacerbation with TTE showing severe global LV dysfunction. Cardiology consulted

## 2022-11-16 NOTE — CONSULT NOTE ADULT - ATTENDING COMMENTS
New systolic dysfunction and volume overload in patient with fam hx of premature CAD (uncle MI 60s, brother MI age 42)  Possible etiologies include ischemia, NICM, etoh-related cardiomyopathy, hx of COVID-19    -check [facility] on 11/17/2022  -increase furosemide to 40mg IV BID  -consider cardiac MRI for further risk stratification, though can possible check as outpt  -start metoprolol succinate 25mg once daily  -start losartan 25mg daily

## 2022-11-16 NOTE — PROGRESS NOTE ADULT - SUBJECTIVE AND OBJECTIVE BOX
Patient is a 46y old  Male who presents with a chief complaint of shortness of breath (16 Nov 2022 11:12)    Jerome Pandya MD   [facility]   Pager [phone]  Reachable on Microsoft Teams     SUBJECTIVE / OVERNIGHT EVENTS:  Patient seen and examined this morning. Chest pain resolved. Still with shortness of breath that is mostly unchanges.   No fevers, chills, nausea or vomiting.     MEDICATIONS  (STANDING):  furosemide   Injectable 20 milliGRAM(s) IV Push two times a day    MEDICATIONS  (PRN):  acetaminophen     Tablet .. 650 milliGRAM(s) Oral every 6 hours PRN Temp greater or equal to 38C (100.4F), Mild Pain (1 - 3)  melatonin 3 milliGRAM(s) Oral at bedtime PRN Insomnia      Vital Signs Last 24 Hrs  T(C): 36.5 (16 Nov 2022 08:40), Max: 37.2 (15 Nov 2022 18:40)  T(F): 97.7 (16 Nov 2022 08:40), Max: 98.9 (15 Nov 2022 18:40)  HR: 97 (16 Nov 2022 12:11) (89 - 101)  BP: 133/93 (16 Nov 2022 12:11) (133/93 - 161/113)  BP(mean): --  RR: 20 (16 Nov 2022 12:11) (15 - 24)  SpO2: 98% (16 Nov 2022 12:11) (97% - 99%)  CAPILLARY BLOOD GLUCOSE        I&O's Summary      General: obese man sitting in chair in NAD, awake and alert  Eyes: conjunctiva clear, nonicteric sclera  HENMT: NCAT, MMM  Neck: Supple, trachea midline   Respiratory: No respiratory distress, CTABL, No rales, rhonchi, wheezing.  Cardiovascular: S1,S2; Regular rate and rhythm; No m/g/r. 2+ peripheral pulses  Gastrointestinal: Soft, Nontender, Nondistended; +BS.   Extremities: No c/c/e; warm to touch  Neurological: Moving all 4 extremities; Sensation to LT grossly in tact.  Skin: No rashes, No erythema   Psych: AAOx3; appropriate mood and affect    LABS:                        11.1   8.00  )-----------( 304      ( 16 Nov 2022 10:17 )             35.9     11-16    141  |  103  |  13  ----------------------------<  113<H>  4.1   |  25  |  0.99    Ca    9.5      16 Nov 2022 10:17  Phos  3.6     11-16  Mg     1.90     11-16    TPro  7.4  /  Alb  4.1  /  TBili  1.5<H>  /  DBili  x   /  AST  20  /  ALT  17  /  AlkPhos  101  11-16              RADIOLOGY & ADDITIONAL TESTS:    Imaging Personally Reviewed:    Consultant(s) Notes Reviewed:      Care Discussed with Consultants/Other Providers:  . Patient is a 46y old  Male who presents with a chief complaint of shortness of breath (16 Nov 2022 11:12)    Jerome Pandya MD   [facility] Medicine   Pager [phone]  Reachable on Microsoft Teams     SUBJECTIVE / OVERNIGHT EVENTS:  Patient seen and examined this morning. Chest pain resolved. Still with shortness of breath that is mostly unchanges.   No fevers, chills, nausea or vomiting.     MEDICATIONS  (STANDING):  furosemide   Injectable 20 milliGRAM(s) IV Push two times a day    MEDICATIONS  (PRN):  acetaminophen     Tablet .. 650 milliGRAM(s) Oral every 6 hours PRN Temp greater or equal to 38C (100.4F), Mild Pain (1 - 3)  melatonin 3 milliGRAM(s) Oral at bedtime PRN Insomnia      Vital Signs Last 24 Hrs  T(C): 36.5 (16 Nov 2022 08:40), Max: 37.2 (15 Nov 2022 18:40)  T(F): 97.7 (16 Nov 2022 08:40), Max: 98.9 (15 Nov 2022 18:40)  HR: 97 (16 Nov 2022 12:11) (89 - 101)  BP: 133/93 (16 Nov 2022 12:11) (133/93 - 161/113)  BP(mean): --  RR: 20 (16 Nov 2022 12:11) (15 - 24)  SpO2: 98% (16 Nov 2022 12:11) (97% - 99%)  CAPILLARY BLOOD GLUCOSE        I&O's Summary      General: obese man sitting in chair in NAD, awake and alert  Eyes: conjunctiva clear, nonicteric sclera  HENMT: NCAT, MMM  Neck: +JVD, trachea midline  Cardiovascular: S1,S2; Regular rate and rhythm; No m/g/r. 2+ peripheral pulses  Gastrointestinal: Soft, Nontender, Nondistended; +BS.   Extremities: No c/c/e; warm to touch  Skin: No rashes, No erythema   Psych:appropriate mood and affect    LABS:                        11.1   8.00  )-----------( 304      ( 16 Nov 2022 10:17 )             35.9     11-16    141  |  103  |  13  ----------------------------<  113<H>  4.1   |  25  |  0.99    Ca    9.5      16 Nov 2022 10:17  Phos  3.6     11-16  Mg     1.90     11-16    TPro  7.4  /  Alb  4.1  /  TBili  1.5<H>  /  DBili  x   /  AST  20  /  ALT  17  /  AlkPhos  101  11-16              RADIOLOGY & ADDITIONAL TESTS:    Imaging Personally Reviewed:    Consultant(s) Notes Reviewed:      Care Discussed with Consultants/Other Providers: SARA arora, cardiology

## 2022-11-16 NOTE — PROGRESS NOTE ADULT - PROBLEM SELECTOR PLAN 2
- advised to stop drinking  - no signs of acute withdrawal, calm appearing at bedside, no tremors or signs of agitation/anxiety, last drank 1 pint of liquor 2 days ago  - no indication for CIWA protocol at this time

## 2022-11-16 NOTE — CONSULT NOTE ADULT - ASSESSMENT
This is a 47 y/o M current smoker with PMHx of HTN, HLD, COVID (infection 6 months ago and reinfection 1 month ago) presented to the ED for acute on chronic shortness of breath, suspected for heart failure exacerbation, admitted for diuresis. Cardiology consulted for management of new heart failure.     #ADHF  - suspected etiology likely secondary alcohol use, other causes include COVID vs long-standing hypertension   - ECHO this admission with severe LV dysfunction  - plan for LHC this admission to rule out ischemic etiology  - please obtain A1C and lipid panel this admission  - recommend increasing to IV lasix 40 BID given extent of volume overload  - strict I+Os, monitor UOP  - discussed alcohol use, patient is pre-contemplative, recommended cessation   - will plan for starting GDMT once patient is more euvolemic    Vaibhav Uribe MD  Internal medicine, PGY-2 This is a 45 y/o M current smoker with PMHx of HTN, HLD, COVID (infection 6 months ago and reinfection 1 month ago) presented to the ED for acute on chronic shortness of breath, suspected for heart failure exacerbation, admitted for diuresis. Cardiology consulted for management of new heart failure.     #ADHF  - suspected etiology likely secondary alcohol use, other causes include COVID vs long-standing hypertension   - ECHO this admission with severe LV dysfunction  - plan for LHC this admission to rule out ischemic etiology  - please obtain A1C and lipid panel this admission  - recommend increasing to IV lasix 40 BID given extent of volume overload  - strict I+Os, monitor UOP, goal 1-2L per day  - discussed alcohol use, patient is pre-contemplative, recommended cessation   - will plan for starting GDMT once patient is more euvolemic    Vaibhav Uribe MD  Internal medicine, PGY-2

## 2022-11-16 NOTE — PROGRESS NOTE ADULT - PROBLEM SELECTOR PLAN 4
- BMI  - hx of gastric sleeve  - counselled on healthy lifestyle modifications and weight loss   - nutrition consult

## 2022-11-16 NOTE — PROGRESS NOTE ADULT - PROBLEM SELECTOR PLAN 3
- previously on losartan-HCTZ, but reported not taking any medications recently   - just reestablished care with PCP this past week  - reinitiate ARB vs Entresto pending cardiology recs

## 2022-11-16 NOTE — ED ADULT NURSE REASSESSMENT NOTE - NS ED NURSE REASSESS COMMENT FT1
8:45pm pt resting well no c/o of chest pain or sob report was given to UMANG Phipps in CDU pt awaiting transport.
Pt denies any discomfort at this time , in no acute distress pt admitted to medicine pending bed placement karen ferrer
Pt denies chest pain, SOB, palpitations. no distress noted. call bell with in reach, safety maintained. will continue to monitor.

## 2022-11-16 NOTE — PROGRESS NOTE ADULT - PROBLEM SELECTOR PLAN 1
- patient presented for worsening of shortness of breath with exertion  - BNP 2749,  CXR shows new pulmonary congestion  - CTA is neg for PE  - troponins neg x2, EKG shows NSR no ST elevations or TWI  - TTE with poor windows due to body habitus, but showing severe global LV dysfunction   - c/w with IV lasix 20mg BID  - daily weights, strict I/O  - cardiology consulted, f/u recs  - fluid restrict 1L/day, DASH diet

## 2022-11-17 DIAGNOSIS — E78.5 HYPERLIPIDEMIA, UNSPECIFIED: ICD-10-CM

## 2022-11-17 LAB
A1C WITH ESTIMATED AVERAGE GLUCOSE RESULT: 5.6 % — SIGNIFICANT CHANGE UP (ref 4–5.6)
ALBUMIN SERPL ELPH-MCNC: 4.2 G/DL — SIGNIFICANT CHANGE UP (ref 3.3–5)
ALP SERPL-CCNC: 101 U/L — SIGNIFICANT CHANGE UP (ref 40–120)
ALT FLD-CCNC: 19 U/L — SIGNIFICANT CHANGE UP (ref 4–41)
ANION GAP SERPL CALC-SCNC: 14 MMOL/L — SIGNIFICANT CHANGE UP (ref 7–14)
AST SERPL-CCNC: 22 U/L — SIGNIFICANT CHANGE UP (ref 4–40)
BASOPHILS # BLD AUTO: 0.07 K/UL — SIGNIFICANT CHANGE UP (ref 0–0.2)
BASOPHILS NFR BLD AUTO: 0.7 % — SIGNIFICANT CHANGE UP (ref 0–2)
BILIRUB SERPL-MCNC: 1.2 MG/DL — SIGNIFICANT CHANGE UP (ref 0.2–1.2)
BUN SERPL-MCNC: 19 MG/DL — SIGNIFICANT CHANGE UP (ref 7–23)
CALCIUM SERPL-MCNC: 9.6 MG/DL — SIGNIFICANT CHANGE UP (ref 8.4–10.5)
CHLORIDE SERPL-SCNC: 102 MMOL/L — SIGNIFICANT CHANGE UP (ref 98–107)
CHOLEST SERPL-MCNC: 199 MG/DL — SIGNIFICANT CHANGE UP
CO2 SERPL-SCNC: 25 MMOL/L — SIGNIFICANT CHANGE UP (ref 22–31)
CREAT SERPL-MCNC: 1.19 MG/DL — SIGNIFICANT CHANGE UP (ref 0.5–1.3)
EGFR: 76 ML/MIN/1.73M2 — SIGNIFICANT CHANGE UP
EOSINOPHIL # BLD AUTO: 0.5 K/UL — SIGNIFICANT CHANGE UP (ref 0–0.5)
EOSINOPHIL NFR BLD AUTO: 5.1 % — SIGNIFICANT CHANGE UP (ref 0–6)
ESTIMATED AVERAGE GLUCOSE: 114 — SIGNIFICANT CHANGE UP
GLUCOSE SERPL-MCNC: 106 MG/DL — HIGH (ref 70–99)
HCT VFR BLD CALC: 38.4 % — LOW (ref 39–50)
HDLC SERPL-MCNC: 33 MG/DL — LOW
HGB BLD-MCNC: 11.9 G/DL — LOW (ref 13–17)
IANC: 6.05 K/UL — SIGNIFICANT CHANGE UP (ref 1.8–7.4)
IMM GRANULOCYTES NFR BLD AUTO: 0.4 % — SIGNIFICANT CHANGE UP (ref 0–0.9)
LIPID PNL WITH DIRECT LDL SERPL: 141 MG/DL — HIGH
LYMPHOCYTES # BLD AUTO: 2.48 K/UL — SIGNIFICANT CHANGE UP (ref 1–3.3)
LYMPHOCYTES # BLD AUTO: 25.3 % — SIGNIFICANT CHANGE UP (ref 13–44)
MCHC RBC-ENTMCNC: 24.9 PG — LOW (ref 27–34)
MCHC RBC-ENTMCNC: 31 GM/DL — LOW (ref 32–36)
MCV RBC AUTO: 80.3 FL — SIGNIFICANT CHANGE UP (ref 80–100)
MONOCYTES # BLD AUTO: 0.68 K/UL — SIGNIFICANT CHANGE UP (ref 0–0.9)
MONOCYTES NFR BLD AUTO: 6.9 % — SIGNIFICANT CHANGE UP (ref 2–14)
NEUTROPHILS # BLD AUTO: 6.05 K/UL — SIGNIFICANT CHANGE UP (ref 1.8–7.4)
NEUTROPHILS NFR BLD AUTO: 61.6 % — SIGNIFICANT CHANGE UP (ref 43–77)
NON HDL CHOLESTEROL: 166 MG/DL — HIGH
NRBC # BLD: 0 /100 WBCS — SIGNIFICANT CHANGE UP (ref 0–0)
NRBC # FLD: 0 K/UL — SIGNIFICANT CHANGE UP (ref 0–0)
PLATELET # BLD AUTO: 328 K/UL — SIGNIFICANT CHANGE UP (ref 150–400)
POTASSIUM SERPL-MCNC: 4.2 MMOL/L — SIGNIFICANT CHANGE UP (ref 3.5–5.3)
POTASSIUM SERPL-SCNC: 4.2 MMOL/L — SIGNIFICANT CHANGE UP (ref 3.5–5.3)
PROT SERPL-MCNC: 7.7 G/DL — SIGNIFICANT CHANGE UP (ref 6–8.3)
RBC # BLD: 4.78 M/UL — SIGNIFICANT CHANGE UP (ref 4.2–5.8)
RBC # FLD: 15.8 % — HIGH (ref 10.3–14.5)
SODIUM SERPL-SCNC: 141 MMOL/L — SIGNIFICANT CHANGE UP (ref 135–145)
TRIGL SERPL-MCNC: 125 MG/DL — SIGNIFICANT CHANGE UP
WBC # BLD: 9.82 K/UL — SIGNIFICANT CHANGE UP (ref 3.8–10.5)
WBC # FLD AUTO: 9.82 K/UL — SIGNIFICANT CHANGE UP (ref 3.8–10.5)

## 2022-11-17 PROCEDURE — 93454 CORONARY ARTERY ANGIO S&I: CPT | Mod: 26

## 2022-11-17 PROCEDURE — 99233 SBSQ HOSP IP/OBS HIGH 50: CPT

## 2022-11-17 RX ORDER — INFLUENZA VIRUS VACCINE 15; 15; 15; 15 UG/.5ML; UG/.5ML; UG/.5ML; UG/.5ML
0.5 SUSPENSION INTRAMUSCULAR ONCE
Refills: 0 | Status: DISCONTINUED | OUTPATIENT
Start: 2022-11-17 | End: 2022-11-23

## 2022-11-17 RX ORDER — ENOXAPARIN SODIUM 100 MG/ML
60 INJECTION SUBCUTANEOUS EVERY 12 HOURS
Refills: 0 | Status: DISCONTINUED | OUTPATIENT
Start: 2022-11-18 | End: 2022-11-23

## 2022-11-17 RX ORDER — ATORVASTATIN CALCIUM 80 MG/1
40 TABLET, FILM COATED ORAL AT BEDTIME
Refills: 0 | Status: DISCONTINUED | OUTPATIENT
Start: 2022-11-17 | End: 2022-11-18

## 2022-11-17 RX ORDER — METOPROLOL TARTRATE 50 MG
50 TABLET ORAL DAILY
Refills: 0 | Status: DISCONTINUED | OUTPATIENT
Start: 2022-11-17 | End: 2022-11-21

## 2022-11-17 RX ADMIN — Medication 40 MILLIGRAM(S): at 05:01

## 2022-11-17 RX ADMIN — Medication 40 MILLIGRAM(S): at 18:17

## 2022-11-17 RX ADMIN — ATORVASTATIN CALCIUM 40 MILLIGRAM(S): 80 TABLET, FILM COATED ORAL at 21:00

## 2022-11-17 RX ADMIN — Medication 25 MILLIGRAM(S): at 05:01

## 2022-11-17 RX ADMIN — Medication 1 TABLET(S): at 18:17

## 2022-11-17 RX ADMIN — LOSARTAN POTASSIUM 25 MILLIGRAM(S): 100 TABLET, FILM COATED ORAL at 05:02

## 2022-11-17 NOTE — PROGRESS NOTE ADULT - PROBLEM SELECTOR PLAN 2
- advised to stop drinking  - no signs of acute withdrawal, calm appearing at bedside, no tremors or signs of agitation/anxiety, last drank 1 pint of liquor 2 days ago  - monitor off WA protocol at this time - previously on losartan-HCTZ, but reported not taking any medications recently   - just reestablished care with PCP week PTA   - starting metoprolol succinate 25mg daily, and losartan 25 mg daily

## 2022-11-17 NOTE — PROGRESS NOTE ADULT - PROBLEM SELECTOR PLAN 1
- patient presented for worsening of shortness of breath with exertion  - BNP 2749,  CXR shows new pulmonary congestion  - CTA is neg for PE  - troponins neg x2, EKG shows NSR no ST elevations or TWI  - TTE with poor windows due to body habitus, but showing severe global LV dysfunction   - increase IV lasix 40mg BID  - daily weights, strict I/O  - cardiology consulted, planning for [facility] today  - fluid restrict 1L/day, DASH diet - patient presented for worsening of shortness of breath with exertion  - BNP 2749,  CXR shows new pulmonary congestion  - CTA is neg for PE  - troponins neg x2, EKG shows NSR no ST elevations or TWI  - TTE with poor windows due to body habitus, but showing severe global LV dysfunction   - increase IV lasix 40mg BID  [ ] BLE dopplers given asymmetric edema   - daily weights, strict I/O  - cardiology consulted, planning for [facility] today  - fluid restrict 1L/day, DASH diet

## 2022-11-17 NOTE — PROGRESS NOTE ADULT - SUBJECTIVE AND OBJECTIVE BOX
Patient is a 46y old  Male who presents with a chief complaint of shortness of breath (17 Nov 2022 08:39)    Jerome Pandya MD   [facility] Medicine   Pager [phone]  Reachable on [employer] Teams     SUBJECTIVE / OVERNIGHT EVENTS:  Patient seen and examined this morning. He states that his shortness of breath is improved, but continues to have some shortness of breath especially when laying flat.   He states that he feels that he is urinating all the time.     MEDICATIONS  (STANDING):  atorvastatin 40 milliGRAM(s) Oral at bedtime  furosemide   Injectable 40 milliGRAM(s) IV Push two times a day  influenza   Vaccine 0.5 milliLiter(s) IntraMuscular once  losartan 25 milliGRAM(s) Oral daily  metoprolol succinate ER 25 milliGRAM(s) Oral daily    MEDICATIONS  (PRN):  acetaminophen     Tablet .. 650 milliGRAM(s) Oral every 6 hours PRN Temp greater or equal to 38C (100.4F), Mild Pain (1 - 3)  melatonin 3 milliGRAM(s) Oral at bedtime PRN Insomnia      Vital Signs Last 24 Hrs  T(C): 36.4 (17 Nov 2022 10:00), Max: 37.1 (16 Nov 2022 17:44)  T(F): 97.6 (17 Nov 2022 10:00), Max: 98.7 (16 Nov 2022 17:44)  HR: 80 (17 Nov 2022 10:00) (80 - 102)  BP: 127/77 (17 Nov 2022 10:00) (119/72 - 139/95)  BP(mean): --  RR: 16 (17 Nov 2022 10:00) (16 - 22)  SpO2: 97% (17 Nov 2022 10:00) (95% - 100%)  CAPILLARY BLOOD GLUCOSE        I&O's Summary    16 Nov 2022 07:01  -  17 Nov 2022 07:00  --------------------------------------------------------  IN: 920 mL / OUT: 3275 mL / NET: -2355 mL    17 Nov 2022 07:01  -  17 Nov 2022 11:04  --------------------------------------------------------  IN: 0 mL / OUT: 800 mL / NET: -800 mL        General: NAD, awake and alert  Eyes: conjunctiva clear, nonicteric sclera  HENMT: NCAT, MMM  Neck: Supple, trachea midline   Respiratory: No respiratory distress, CTABL, No rales, rhonchi, wheezing.  Cardiovascular: S1,S2; Regular rate and rhythm; No m/g/r. 2+ peripheral pulses  Gastrointestinal: Soft, Nontender, Nondistended; +BS.   Extremities: No c/c/e; warm to touch  Neurological: Moving all 4 extremities; Sensation to LT grossly in tact.  Skin: No rashes, No erythema   Psych: AAOx3; appropriate mood and affect    LABS:                        11.9   9.82  )-----------( 328      ( 17 Nov 2022 05:51 )             38.4     11-17    141  |  102  |  19  ----------------------------<  106<H>  4.2   |  25  |  1.19    Ca    9.6      17 Nov 2022 05:51  Phos  3.6     11-16  Mg     1.90     11-16    TPro  7.7  /  Alb  4.2  /  TBili  1.2  /  DBili  x   /  AST  22  /  ALT  19  /  AlkPhos  101  11-17              RADIOLOGY & ADDITIONAL TESTS:    Imaging Personally Reviewed:    Consultant(s) Notes Reviewed:      Care Discussed with Consultants/Other Providers:   Patient is a 46y old  Male who presents with a chief complaint of shortness of breath (17 Nov 2022 08:39)    Jerome Pandya MD   [facility]   Pager [phone]  Reachable on [employer] Teams     SUBJECTIVE / OVERNIGHT EVENTS:  Patient seen and examined this morning. He states that his shortness of breath is improved, but continues to have some shortness of breath especially when laying flat.   He states that he feels that he is urinating all the time.     MEDICATIONS  (STANDING):  atorvastatin 40 milliGRAM(s) Oral at bedtime  furosemide   Injectable 40 milliGRAM(s) IV Push two times a day  influenza   Vaccine 0.5 milliLiter(s) IntraMuscular once  losartan 25 milliGRAM(s) Oral daily  metoprolol succinate ER 25 milliGRAM(s) Oral daily    MEDICATIONS  (PRN):  acetaminophen     Tablet .. 650 milliGRAM(s) Oral every 6 hours PRN Temp greater or equal to 38C (100.4F), Mild Pain (1 - 3)  melatonin 3 milliGRAM(s) Oral at bedtime PRN Insomnia      Vital Signs Last 24 Hrs  T(C): 36.4 (17 Nov 2022 10:00), Max: 37.1 (16 Nov 2022 17:44)  T(F): 97.6 (17 Nov 2022 10:00), Max: 98.7 (16 Nov 2022 17:44)  HR: 80 (17 Nov 2022 10:00) (80 - 102)  BP: 127/77 (17 Nov 2022 10:00) (119/72 - 139/95)  BP(mean): --  RR: 16 (17 Nov 2022 10:00) (16 - 22)  SpO2: 97% (17 Nov 2022 10:00) (95% - 100%)  CAPILLARY BLOOD GLUCOSE        I&O's Summary    16 Nov 2022 07:01  -  17 Nov 2022 07:00  --------------------------------------------------------  IN: 920 mL / OUT: 3275 mL / NET: -2355 mL    17 Nov 2022 07:01  -  17 Nov 2022 11:04  --------------------------------------------------------  IN: 0 mL / OUT: 800 mL / NET: -800 mL      General: obese man sitting in chair in NAD, awake and alert  Eyes: conjunctiva clear, nonicteric sclera  HENMT: NCAT, MMM  Neck: +JVD, trachea midline  Cardiovascular: distant HS, S1,S2; Regular rate and rhythm;   Gastrointestinal: Soft, Nontender, obese abdomen; +BS.   Extremities: bilateral 3+ pitting LE edema L>R. warm to touch  Skin: No rashes, No erythema   Psych: appropriate mood and affect    LABS:                        11.9   9.82  )-----------( 328      ( 17 Nov 2022 05:51 )             38.4     11-17    141  |  102  |  19  ----------------------------<  106<H>  4.2   |  25  |  1.19    Ca    9.6      17 Nov 2022 05:51  Phos  3.6     11-16  Mg     1.90     11-16    TPro  7.7  /  Alb  4.2  /  TBili  1.2  /  DBili  x   /  AST  22  /  ALT  19  /  AlkPhos  101  11-17              RADIOLOGY & ADDITIONAL TESTS:    Imaging Personally Reviewed:    Consultant(s) Notes Reviewed:      Care Discussed with Consultants/Other Providers: SARA

## 2022-11-17 NOTE — PROGRESS NOTE ADULT - PROBLEM SELECTOR PLAN 4
- BMI  - hx of gastric sleeve  - counselled on healthy lifestyle modifications and weight loss   - nutrition consult - advised to stop drinking  - no signs of acute withdrawal, calm appearing at bedside, no tremors or signs of agitation/anxiety  - monitor off CIWA protocol at this time

## 2022-11-17 NOTE — DIETITIAN INITIAL EVALUATION ADULT - PERTINENT MEDS FT
MEDICATIONS  (STANDING):  atorvastatin 40 milliGRAM(s) Oral at bedtime  furosemide   Injectable 40 milliGRAM(s) IV Push two times a day  influenza   Vaccine 0.5 milliLiter(s) IntraMuscular once  losartan 25 milliGRAM(s) Oral daily  metoprolol succinate ER 25 milliGRAM(s) Oral daily    MEDICATIONS  (PRN):  acetaminophen     Tablet .. 650 milliGRAM(s) Oral every 6 hours PRN Temp greater or equal to 38C (100.4F), Mild Pain (1 - 3)  melatonin 3 milliGRAM(s) Oral at bedtime PRN Insomnia

## 2022-11-17 NOTE — PROGRESS NOTE ADULT - PROBLEM SELECTOR PLAN 5
DVTppx: LMWH - may need dose increase pending BMI - BMI 56  - hx of gastric sleeve  - counselled on healthy lifestyle modifications and weight loss   - nutrition consult

## 2022-11-17 NOTE — CHART NOTE - NSCHARTNOTEFT_GEN_A_CORE
Pt s/p L heart cath via RR site. Site examined-soft, non-tender, no hematoma palpated, no bleeding. Capillary refill <2 seconds.

## 2022-11-17 NOTE — DIETITIAN INITIAL EVALUATION ADULT - ADD RECOMMEND
1) Continue DASH/TLC diet. Fluid restriction as per MD discretion.   2) Consider MVI daily for micronutrient coverage.   3) Written heart failure nutrition therapy materials left by bedside for patient to read at own discretion.   4) Monitor weights, labs, PO intake.   5) RD to follow up per protocol.

## 2022-11-17 NOTE — PROGRESS NOTE ADULT - ASSESSMENT
46M with PMH of HTN, presented to the ED for worsening SOB, diagnosed acute CHF exacerbation with TTE showing severe global LV dysfunction. Cardiology consulted and planning for C today.

## 2022-11-17 NOTE — PATIENT PROFILE ADULT - FALL HARM RISK - HARM RISK INTERVENTIONS

## 2022-11-17 NOTE — DIETITIAN INITIAL EVALUATION ADULT - PERTINENT LABORATORY DATA
11-17 Na 141 mmol/L Glu 106 mg/dL<H> K+ 4.2 mmol/L Cr 1.19 mg/dL BUN 19 mg/dL Phos n/a      A1C with Estimated Average Glucose Result: 5.6 % (11-17-22 @ 05:51)

## 2022-11-17 NOTE — DIETITIAN INITIAL EVALUATION ADULT - OTHER INFO
46 year old male with PMH of HTN, presented to the ED for worsening SOB, diagnosed acute CHF exacerbation with TTE showing severe global LV dysfunction. Cardiology consulted and planned for [facility] today. Cardiology following.     Attempted to visit patient two times - patient off unit for procedure. Comprehensive chart review completed. Weight history as per adal DUNN, Feb 2019: patient weighed 424.2 lbs (+3.7%) - weights appear to have remained stable over past 3 years. As per H&P, patient with history of lap band surgery (unknown date). Patient also has history of mild alcohol abuse - drinks 1 pint of liquor 3x per week. Patient newly diagnosed with acute CHF on admission. NKFA.    Patient made NPO after midnight for procedure. No documented intake prior to NPO status. DASH/TLC diet now reactivated. No documented complaints of N/V/D/C at this time. No documented chewing or swallowing issues. Patient presenting with 2+ B/L foot and ankle edema secondary to CHF. Unable to verbally educate patient as he was off the unit - left written heart failure nutrition therapy materials by the bedside. RD to follow up with further education as needed.

## 2022-11-17 NOTE — PROGRESS NOTE ADULT - SUBJECTIVE AND OBJECTIVE BOX
Patient seen and examined at bedside.    Overnight Events: No acute events overnight. Denies chest pain or SOB      REVIEW OF SYSTEMS:  Constitutional:     [ x] negative [ ] fevers [ ] chills [ ] weight loss [ ] weight gain  HEENT:                  [ x] negative [ ] dry eyes [ ] eye irritation [ ] postnasal drip [ ] nasal congestion  CV:                         [x ] negative  [ ] chest pain [ ] orthopnea [ ] palpitations [ ] murmur  Resp:                     [ x] negative [ ] cough [ ] shortness of breath [ ] dyspnea [ ] wheezing [ ] sputum [ ]hemoptysis  GI:                          [ x] negative [ ] nausea [ ] vomiting [ ] diarrhea [ ] constipation [ ] abd pain [ ] dysphagia   :                        [ x] negative [ ] dysuria [ ] nocturia [ ] hematuria [ ] increased urinary frequency  Musculoskeletal: [ x] negative [ ] back pain [ ] myalgias [ ] arthralgias [ ] fracture  Skin:                       [ x] negative [ ] rash [ ] itch  Neurological:        [ x] negative [ ] headache [ ] dizziness [ ] syncope [ ] weakness [ ] numbness  Psychiatric:           [ x] negative [ ] anxiety [ ] depression  Endocrine:            [ x] negative [ ] diabetes [ ] thyroid problem  Heme/Lymph:      [ x] negative [ ] anemia [ ] bleeding problem  Allergic/Immune: [x ] negative [ ] itchy eyes [ ] nasal discharge [ ] hives [ ] angioedema    [x ] All other systems negative  [ ] Unable to assess ROS due to    Current Meds:  acetaminophen     Tablet .. 650 milliGRAM(s) Oral every 6 hours PRN  furosemide   Injectable 40 milliGRAM(s) IV Push two times a day  influenza   Vaccine 0.5 milliLiter(s) IntraMuscular once  losartan 25 milliGRAM(s) Oral daily  melatonin 3 milliGRAM(s) Oral at bedtime PRN  metoprolol succinate ER 25 milliGRAM(s) Oral daily      PAST MEDICAL & SURGICAL HISTORY:  Obesity      Hyperlipidemia      Gastric bypass status for obesity  s/p Lap band      Sleep apnea      Hypertension      H/O gastric bypass  s/p Lap band          Vitals:  T(F): 98.4 (-), Max: 98.7 ()  HR: 86 () (85 - 102)  BP: 125/76 () (119/72 - 161/113)  RR: 18 (-)  SpO2: 96% ()  I&O's Summary    2022 07:01  -  2022 07:00  --------------------------------------------------------  IN: 920 mL / OUT: 3275 mL / NET: -2355 mL    2022 07:01  -  2022 08:39  --------------------------------------------------------  IN: 0 mL / OUT: 800 mL / NET: -800 mL        Physical Exam:  Appearance: No acute distress; well appearing  Eyes: PERRL, EOMI, pink conjunctiva  HENT: Normal oral mucosa  Cardiovascular: RRR, S1, S2, no murmurs, rubs, or gallops; no edema; no JVD  Respiratory: Clear to auscultation bilaterally  Gastrointestinal: soft, non-tender, non-distended with normal bowel sounds  Musculoskeletal: No clubbing; no joint deformity   Neurologic: Non-focal  Lymphatic: No lymphadenopathy  Psychiatry: AAOx3, mood & affect appropriate  Skin: No rashes, ecchymoses, or cyanosis                          11.9   9.82  )-----------( 328      ( 2022 05:51 )             38.4     -    141  |  102  |  19  ----------------------------<  106<H>  4.2   |  25  |  1.19    Ca    9.6      2022 05:51  Phos  3.6     11-16  Mg     1.90     -    TPro  7.7  /  Alb  4.2  /  TBili  1.2  /  DBili  x   /  AST  22  /  ALT  19  /  AlkPhos  101  -          Serum Pro-Brain Natriuretic Peptide: 2729 pg/mL (15 @ 12:00)    Total Cholesterol: 199  LDL: --  HDL: 33  T           Patient seen and examined at bedside.    Overnight Events: No acute events overnight. Denies chest pain or SOB. 3L urine output over past 24 hours.       REVIEW OF SYSTEMS:  Constitutional:     [ x] negative [ ] fevers [ ] chills [ ] weight loss [ ] weight gain  HEENT:                  [ x] negative [ ] dry eyes [ ] eye irritation [ ] postnasal drip [ ] nasal congestion  CV:                         [x ] negative  [ ] chest pain [ ] orthopnea [ ] palpitations [ ] murmur  Resp:                     [ x] negative [ ] cough [ ] shortness of breath [ ] dyspnea [ ] wheezing [ ] sputum [ ]hemoptysis  GI:                          [ x] negative [ ] nausea [ ] vomiting [ ] diarrhea [ ] constipation [ ] abd pain [ ] dysphagia   :                        [ x] negative [ ] dysuria [ ] nocturia [ ] hematuria [ ] increased urinary frequency  Musculoskeletal: [ x] negative [ ] back pain [ ] myalgias [ ] arthralgias [ ] fracture  Skin:                       [ x] negative [ ] rash [ ] itch  Neurological:        [ x] negative [ ] headache [ ] dizziness [ ] syncope [ ] weakness [ ] numbness  Psychiatric:           [ x] negative [ ] anxiety [ ] depression  Endocrine:            [ x] negative [ ] diabetes [ ] thyroid problem  Heme/Lymph:      [ x] negative [ ] anemia [ ] bleeding problem  Allergic/Immune: [x ] negative [ ] itchy eyes [ ] nasal discharge [ ] hives [ ] angioedema    [x ] All other systems negative  [ ] Unable to assess ROS due to    Current Meds:  acetaminophen     Tablet .. 650 milliGRAM(s) Oral every 6 hours PRN  furosemide   Injectable 40 milliGRAM(s) IV Push two times a day  influenza   Vaccine 0.5 milliLiter(s) IntraMuscular once  losartan 25 milliGRAM(s) Oral daily  melatonin 3 milliGRAM(s) Oral at bedtime PRN  metoprolol succinate ER 25 milliGRAM(s) Oral daily      PAST MEDICAL & SURGICAL HISTORY:  Obesity      Hyperlipidemia      Gastric bypass status for obesity  s/p Lap band      Sleep apnea      Hypertension      H/O gastric bypass  s/p Lap band          Vitals:  T(F): 98.4 (-), Max: 98.7 (-16)  HR: 86 () (85 - 102)  BP: 125/76 (-) (119/72 - 161/113)  RR: 18 (11-17)  SpO2: 96% ()  I&O's Summary    2022 07:01  -  2022 07:00  --------------------------------------------------------  IN: 920 mL / OUT: 3275 mL / NET: -2355 mL    2022 07:01  -  2022 08:39  --------------------------------------------------------  IN: 0 mL / OUT: 800 mL / NET: -800 mL        Physical Exam:  Appearance: No acute distress; well appearing  Eyes: PERRL, EOMI, pink conjunctiva  HENT: Normal oral mucosa  Cardiovascular: RRR, S1, S2, no murmurs, rubs, or gallops; no edema; no JVD  Respiratory: Clear to auscultation bilaterally  Gastrointestinal: soft, non-tender, non-distended with normal bowel sounds  Musculoskeletal: No clubbing; no joint deformity   Neurologic: Non-focal  Lymphatic: No lymphadenopathy  Psychiatry: AAOx3, mood & affect appropriate  Skin: No rashes, ecchymoses, or cyanosis                          11.9   9.82  )-----------( 328      ( 2022 05:51 )             38.4         141  |  102  |  19  ----------------------------<  106<H>  4.2   |  25  |  1.19    Ca    9.6      2022 05:51  Phos  3.6     -  Mg     1.90         TPro  7.7  /  Alb  4.2  /  TBili  1.2  /  DBili  x   /  AST  22  /  ALT  19  /  AlkPhos  101            Serum Pro-Brain Natriuretic Peptide: 2729 pg/mL (11-15 @ 12:00)    Total Cholesterol: 199  LDL: --  HDL: 33  T           Patient seen and examined at bedside.    Overnight Events: No acute events overnight. Denies chest pain or palpitations. Reports shortness of breath is improved, still with intermittent cough. 3L urine output over past 24 hours.       REVIEW OF SYSTEMS:  Constitutional:     [ x] negative [ ] fevers [ ] chills [ ] weight loss [ ] weight gain  HEENT:                  [ x] negative [ ] dry eyes [ ] eye irritation [ ] postnasal drip [ ] nasal congestion  CV:                         [x ] negative  [ ] chest pain [ ] orthopnea [ ] palpitations [ ] murmur  Resp:                     [ ] negative [x ] cough [ ] shortness of breath [ ] dyspnea [ ] wheezing [ ] sputum [ ]hemoptysis  GI:                          [ x] negative [ ] nausea [ ] vomiting [ ] diarrhea [ ] constipation [ ] abd pain [ ] dysphagia   :                        [ x] negative [ ] dysuria [ ] nocturia [ ] hematuria [ ] increased urinary frequency  Musculoskeletal: [ x] negative [ ] back pain [ ] myalgias [ ] arthralgias [ ] fracture  Skin:                       [ x] negative [ ] rash [ ] itch  Neurological:        [ x] negative [ ] headache [ ] dizziness [ ] syncope [ ] weakness [ ] numbness  Psychiatric:           [ x] negative [ ] anxiety [ ] depression  Endocrine:            [ x] negative [ ] diabetes [ ] thyroid problem  Heme/Lymph:      [ x] negative [ ] anemia [ ] bleeding problem  Allergic/Immune: [x ] negative [ ] itchy eyes [ ] nasal discharge [ ] hives [ ] angioedema    [x ] All other systems negative  [ ] Unable to assess ROS due to    Current Meds:  acetaminophen     Tablet .. 650 milliGRAM(s) Oral every 6 hours PRN  furosemide   Injectable 40 milliGRAM(s) IV Push two times a day  influenza   Vaccine 0.5 milliLiter(s) IntraMuscular once  losartan 25 milliGRAM(s) Oral daily  melatonin 3 milliGRAM(s) Oral at bedtime PRN  metoprolol succinate ER 25 milliGRAM(s) Oral daily      PAST MEDICAL & SURGICAL HISTORY:  Obesity      Hyperlipidemia      Gastric bypass status for obesity  s/p Lap band      Sleep apnea      Hypertension      H/O gastric bypass  s/p Lap band          Vitals:  T(F): 98.4 (11-17), Max: 98.7 (11-16)  HR: 86 (-) (85 - 102)  BP: 125/76 (-17) (119/72 - 161/113)  RR: 18 ()  SpO2: 96% ()  I&O's Summary    2022 07:01  -  2022 07:00  --------------------------------------------------------  IN: 920 mL / OUT: 3275 mL / NET: -2355 mL    2022 07:01  -  2022 08:39  --------------------------------------------------------  IN: 0 mL / OUT: 800 mL / NET: -800 mL        Physical Exam:  Appearance: No acute distress; well appearing  Eyes: PERRL, EOMI, pink conjunctiva  HENT: Normal oral mucosa  Cardiovascular: RRR, S1, S2, no murmurs, rubs, or gallops; 2-3+ pitting edema, improved from yesterday  Respiratory: Clear to auscultation bilaterally  Gastrointestinal: soft, non-tender, non-distended with normal bowel sounds  Musculoskeletal: No clubbing; no joint deformity   Neurologic: Non-focal  Lymphatic: No lymphadenopathy  Psychiatry: AAOx3, mood & affect appropriate  Skin: No rashes, ecchymoses, or cyanosis                          11.9   9.82  )-----------( 328      ( 2022 05:51 )             38.4         141  |  102  |  19  ----------------------------<  106<H>  4.2   |  25  |  1.19    Ca    9.6      2022 05:51  Phos  3.6       Mg     1.90         TPro  7.7  /  Alb  4.2  /  TBili  1.2  /  DBili  x   /  AST  22  /  ALT  19  /  AlkPhos  101            Serum Pro-Brain Natriuretic Peptide: 2729 pg/mL (11-15 @ 12:00)    Total Cholesterol: 199  LDL: --  HDL: 33  T

## 2022-11-17 NOTE — DIETITIAN INITIAL EVALUATION ADULT - NS FNS DIET ORDER
Diet, Soft and Bite Sized:   DASH/TLC {Sodium & Cholesterol Restricted} (DASH)  1000mL Fluid Restriction ([id number]) (11-15-22 @ 22:52) [Active]

## 2022-11-17 NOTE — DIETITIAN INITIAL EVALUATION ADULT - ETIOLOGY
related to excessive caloric intake related to related to insufficient prior education of proper dietary recommendations for acute CHF

## 2022-11-18 LAB
ANION GAP SERPL CALC-SCNC: 11 MMOL/L — SIGNIFICANT CHANGE UP (ref 7–14)
BUN SERPL-MCNC: 17 MG/DL — SIGNIFICANT CHANGE UP (ref 7–23)
CALCIUM SERPL-MCNC: 9.9 MG/DL — SIGNIFICANT CHANGE UP (ref 8.4–10.5)
CHLORIDE SERPL-SCNC: 97 MMOL/L — LOW (ref 98–107)
CO2 SERPL-SCNC: 27 MMOL/L — SIGNIFICANT CHANGE UP (ref 22–31)
CREAT SERPL-MCNC: 1.09 MG/DL — SIGNIFICANT CHANGE UP (ref 0.5–1.3)
EGFR: 85 ML/MIN/1.73M2 — SIGNIFICANT CHANGE UP
GLUCOSE SERPL-MCNC: 99 MG/DL — SIGNIFICANT CHANGE UP (ref 70–99)
HCT VFR BLD CALC: 39.4 % — SIGNIFICANT CHANGE UP (ref 39–50)
HGB BLD-MCNC: 12 G/DL — LOW (ref 13–17)
MAGNESIUM SERPL-MCNC: 2.2 MG/DL — SIGNIFICANT CHANGE UP (ref 1.6–2.6)
MCHC RBC-ENTMCNC: 24.8 PG — LOW (ref 27–34)
MCHC RBC-ENTMCNC: 30.5 GM/DL — LOW (ref 32–36)
MCV RBC AUTO: 81.4 FL — SIGNIFICANT CHANGE UP (ref 80–100)
NRBC # BLD: 0 /100 WBCS — SIGNIFICANT CHANGE UP (ref 0–0)
NRBC # FLD: 0 K/UL — SIGNIFICANT CHANGE UP (ref 0–0)
PHOSPHATE SERPL-MCNC: 4.4 MG/DL — SIGNIFICANT CHANGE UP (ref 2.5–4.5)
PLATELET # BLD AUTO: 330 K/UL — SIGNIFICANT CHANGE UP (ref 150–400)
POTASSIUM SERPL-MCNC: 3.9 MMOL/L — SIGNIFICANT CHANGE UP (ref 3.5–5.3)
POTASSIUM SERPL-SCNC: 3.9 MMOL/L — SIGNIFICANT CHANGE UP (ref 3.5–5.3)
RBC # BLD: 4.84 M/UL — SIGNIFICANT CHANGE UP (ref 4.2–5.8)
RBC # FLD: 15.6 % — HIGH (ref 10.3–14.5)
SODIUM SERPL-SCNC: 135 MMOL/L — SIGNIFICANT CHANGE UP (ref 135–145)
WBC # BLD: 8.42 K/UL — SIGNIFICANT CHANGE UP (ref 3.8–10.5)
WBC # FLD AUTO: 8.42 K/UL — SIGNIFICANT CHANGE UP (ref 3.8–10.5)

## 2022-11-18 PROCEDURE — 93970 EXTREMITY STUDY: CPT | Mod: 26

## 2022-11-18 PROCEDURE — 99233 SBSQ HOSP IP/OBS HIGH 50: CPT

## 2022-11-18 RX ORDER — ASPIRIN/CALCIUM CARB/MAGNESIUM 324 MG
81 TABLET ORAL DAILY
Refills: 0 | Status: DISCONTINUED | OUTPATIENT
Start: 2022-11-18 | End: 2022-11-23

## 2022-11-18 RX ORDER — SACUBITRIL AND VALSARTAN 24; 26 MG/1; MG/1
1 TABLET, FILM COATED ORAL
Qty: 30 | Refills: 0
Start: 2022-11-18 | End: 2022-12-17

## 2022-11-18 RX ORDER — ATORVASTATIN CALCIUM 80 MG/1
80 TABLET, FILM COATED ORAL AT BEDTIME
Refills: 0 | Status: DISCONTINUED | OUTPATIENT
Start: 2022-11-18 | End: 2022-11-23

## 2022-11-18 RX ADMIN — ENOXAPARIN SODIUM 60 MILLIGRAM(S): 100 INJECTION SUBCUTANEOUS at 05:47

## 2022-11-18 RX ADMIN — Medication 1 TABLET(S): at 17:35

## 2022-11-18 RX ADMIN — Medication 81 MILLIGRAM(S): at 17:35

## 2022-11-18 RX ADMIN — ATORVASTATIN CALCIUM 80 MILLIGRAM(S): 80 TABLET, FILM COATED ORAL at 21:30

## 2022-11-18 RX ADMIN — Medication 50 MILLIGRAM(S): at 05:47

## 2022-11-18 RX ADMIN — Medication 40 MILLIGRAM(S): at 05:47

## 2022-11-18 RX ADMIN — Medication 3 MILLIGRAM(S): at 21:28

## 2022-11-18 RX ADMIN — ENOXAPARIN SODIUM 60 MILLIGRAM(S): 100 INJECTION SUBCUTANEOUS at 17:35

## 2022-11-18 RX ADMIN — LOSARTAN POTASSIUM 25 MILLIGRAM(S): 100 TABLET, FILM COATED ORAL at 05:47

## 2022-11-18 RX ADMIN — Medication 40 MILLIGRAM(S): at 17:35

## 2022-11-18 NOTE — PROGRESS NOTE ADULT - PROBLEM SELECTOR PLAN 2
- previously on losartan-HCTZ, but reported not taking any medications recently   - reestablished care with PCP week PTA   - c/w metoprolol succinate 25mg daily, and losartan 25 mg daily

## 2022-11-18 NOTE — PROGRESS NOTE ADULT - PROBLEM SELECTOR PLAN 4
- advised to stop drinking  - no signs of acute withdrawal, calm appearing at bedside, no tremors or signs of agitation/anxiety  - monitoring off CIWA protocol at this time

## 2022-11-18 NOTE — PROGRESS NOTE ADULT - PROBLEM SELECTOR PLAN 5
- BMI 56  - hx of gastric sleeve  - counselled on healthy lifestyle modifications and weight loss   - nutrition consult, MVI

## 2022-11-18 NOTE — PROGRESS NOTE ADULT - PROBLEM SELECTOR PLAN 1
# Non-ischemic cardiomyopathy   - patient presented for worsening of shortness of breath with exertion, noted to be severely hypervolemic   - BNP 2749,  CXR shows new pulmonary congestion  - CTA is neg for PE  - troponins neg x2, EKG shows NSR no ST elevations or TWI  - TTE with poor windows due to body habitus, but showing severe global LV dysfunction   - LHC with mild atherosclerosis in Cx and LAD and mod-severe disease of RCA.   - BLE dopplers negative   - c/w IV lasix 40mg BID  - GDMT: metoprolol succinate 25mg daily, and losartan 25 mg daily; sending Entresto to pharmacy for price check; ? SGLT-2  - cardiology consulted appreciate recs  - daily weights, strict I/Os, fluid restrict 1L/day, DASH diet, nutrition consult

## 2022-11-18 NOTE — PROGRESS NOTE ADULT - ASSESSMENT
This is a 45 y/o M current smoker with PMHx of HTN, HLD, COVID (infection 6 months ago and reinfection 1 month ago) presented to the ED for acute on chronic shortness of breath, suspected for heart failure exacerbation, admitted for diuresis. Cardiology consulted for management of new heart failure.     1. Acute Decompensated Heart Failure - NICM LHC shows moderate-severe atherosclerosis of mRCA but would not explain global LV dysfunction   2. Coronary Artery Disease       RECOMMENDATIONS:  - Would start Aspirin 81mg daily  - Would switch to high intensity atorvastatin 80mg daily  - Continue 40mg IV Lasix every twelve hours - diuresing well  - Monitor strict I/Os, daily weights  - Continue Losartan 25mg daily will eventually be transitioned to [facility]   - Will need further non-ischemic cardiomyopathy work-up starting with cardiac MRI which can be done as an outpatient   - Will need repeat echocardiogram in 3 months to assess LVEF on GDMT to assess for device       Note not final until signed by attending       Joey Anderson MD  Cardiology Fellow PGY-5  Phone: [phone]    For all New Consults  [email or web address]   Login: [email or web address] This is a 47 y/o M current smoker with PMHx of HTN, HLD, COVID (infection 6 months ago and reinfection 1 month ago) presented to the ED for acute on chronic shortness of breath, suspected for heart failure exacerbation, admitted for diuresis. Cardiology consulted for management of new heart failure.     1. Acute Decompensated Heart Failure - NICM LHC shows moderate-severe atherosclerosis of mRCA but would not explain global LV dysfunction   2. Coronary Artery Disease       RECOMMENDATIONS:  - Would start Aspirin 81mg daily  - Would switch to high intensity atorvastatin 80mg daily  - Continue 40mg IV Lasix every twelve hours - diuresing well goal >-2L  - Monitor strict I/Os, daily weights  - Continue Losartan 25mg daily will eventually be transitioned to ARNi   - Will need further non-ischemic cardiomyopathy work-up starting with cardiac MRI which can be done as an outpatient   - Will need repeat echocardiogram in 3 months to assess LVEF on GDMT to assess for device       Note not final until signed by attending       Joey Anderson MD  Cardiology Fellow PGY-5  Phone: [phone]    For all New Consults  [email or web address]   Login: [employer] ([email or web address])[email or web address]

## 2022-11-18 NOTE — PROGRESS NOTE ADULT - PROBLEM SELECTOR PLAN 3
previously on rosuvastatin 20mg, however discontinued when he fell out of follow up   - lipid panel with LDL of 141  - started atorvastatin 80mg, ASA 81mg

## 2022-11-18 NOTE — PROGRESS NOTE ADULT - SUBJECTIVE AND OBJECTIVE BOX
Patient seen and examined at bedside.    Overnight Events:     REVIEW OF SYSTEMS:  General: no fatigue/malaise, weight loss/gain.  Skin: no rashes.  Ophthalmologic: no blurred vision, no loss of vision. 	  ENT: no sore throat, rhinorrhea, sinus congestion.  Respiratory: no SOB, cough or wheeze.  CV: No chest pain. No palpitations.   Gastrointestinal:  no N/V/D, no melena/hematemesis/hematochezia.  Genitourinary: no dysuria/hesitancy or hematuria.  Musculoskeletal: no myalgias or arthralgias.  Neurological: no changes in vision or hearing, no lightheadedness/dizziness, no syncope/near syncope	  Psychiatric: no unusual stress/anxiety.   Hematology/Lymphatics: no unusual bleeding, bruising and no lymphadenopathy.  Endocrine: no unusual thirst.        Current Meds:  acetaminophen     Tablet .. 650 milliGRAM(s) Oral every 6 hours PRN  atorvastatin 40 milliGRAM(s) Oral at bedtime  enoxaparin Injectable 60 milliGRAM(s) SubCutaneous every 12 hours  furosemide   Injectable 40 milliGRAM(s) IV Push two times a day  influenza   Vaccine 0.5 milliLiter(s) IntraMuscular once  losartan 25 milliGRAM(s) Oral daily  melatonin 3 milliGRAM(s) Oral at bedtime PRN  metoprolol succinate ER 50 milliGRAM(s) Oral daily  multivitamin 1 Tablet(s) Oral daily      PAST MEDICAL & SURGICAL HISTORY:  Obesity      Hyperlipidemia      Gastric bypass status for obesity  s/p Lap band      Sleep apnea      Hypertension      H/O gastric bypass  s/p Lap band          Vitals:  T(F): 97.9 (11-18), Max: 98.3 (11-17)  HR: 80 (11-18) (72 - 80)  BP: 128/86 (11-18) (116/76 - 149/93)  RR: 18 (11-18)  SpO2: 96% (11-18)  I&O's Summary    16 Nov 2022 07:01  -  17 Nov 2022 07:00  --------------------------------------------------------  IN: 920 mL / OUT: 3275 mL / NET: -2355 mL    17 Nov 2022 07:01  -  18 Nov 2022 06:14  --------------------------------------------------------  IN: 300 mL / OUT: 3800 mL / NET: -3500 mL        Physical Exam:  Appearance: No acute distress  Eyes: PERRL, EOMI, pink conjunctiva  HENT: Normal oral mucosa  Cardiovascular: RRR, S1, S2, no murmurs, rubs, or gallops; no edema; no JVD  Respiratory: Clear to auscultation bilaterally  Gastrointestinal: soft, non-tender, non-distended with normal bowel sounds  Musculoskeletal: No clubbing; no joint deformity   Neurologic: Non-focal  Lymphatic: No lymphadenopathy  Psychiatry: AAOx3, mood & affect appropriate  Skin: No rashes, ecchymoses, or cyanosis                          11.9   9.82  )-----------( 328      ( 17 Nov 2022 05:51 )             38.4     11-17    141  |  102  |  19  ----------------------------<  106<H>  4.2   |  25  |  1.19    Ca    9.6      17 Nov 2022 05:51  Phos  3.6     11-16  Mg     1.90     11-16    TPro  7.7  /  Alb  4.2  /  TBili  1.2  /  DBili  x   /  AST  22  /  ALT  19  /  AlkPhos  101  11-17          Serum Pro-Brain Natriuretic Peptide: 2729 pg/mL (11-15 @ 12:00)          New ECG(s): Personally reviewed    Echo:      OBSERVATIONS:  Mitral Valve: Mitral valve not well visualized.  Aortic Root: Normal aortic root.  Aortic Valve: Aortic valve leaflet morphology not well  visualized.  Left Atrium: Normal left atrium.  Left Ventricle: Endocardium not well visualized; grossly  severe global left ventricular systolic dysfunction.  Endocardial visualization enhanced with intravenous  injection of echo contrast (Definity).  No LV thrombus  seen.  Right Heart: Normal right atrium. The right ventricle is  not well visualized. Tricuspid valve not well visualized.  Pulmonic valve not well visualized.  Pericardium/PleuraNormal pericardium with no pericardial  effusion.  ------------------------------------------------------------------------  CONCLUSIONS:  Technically difficult study.  1. Endocardium not well visualized; grossly severe global  left ventricular systolic dysfunction.  Endocardial  visualization enhanced with intravenous injection of echo  contrast (Definity).  No LV thrombus seen.  2. The right ventricle is not well visualized.  *** No previous Echo exam.      Cath:    Diagnostic Conclusions:   Moderate-severe atherosclerosis in mid RCA. Mild atherosclerosis in Cx  and LAD.    Recommendations:   Aggressive risk factor modification. Optimize medical therapy for  nonischemic cardiomyopathy. Smoking cessation.     Patient seen and examined at bedside.    Overnight Events:   - On tele has bigeminy otherwise NSR  - Denies CP or SOB  - Endorses some orthopnea still        Current Meds:  acetaminophen     Tablet .. 650 milliGRAM(s) Oral every 6 hours PRN  atorvastatin 40 milliGRAM(s) Oral at bedtime  enoxaparin Injectable 60 milliGRAM(s) SubCutaneous every 12 hours  furosemide   Injectable 40 milliGRAM(s) IV Push two times a day  influenza   Vaccine 0.5 milliLiter(s) IntraMuscular once  losartan 25 milliGRAM(s) Oral daily  melatonin 3 milliGRAM(s) Oral at bedtime PRN  metoprolol succinate ER 50 milliGRAM(s) Oral daily  multivitamin 1 Tablet(s) Oral daily      PAST MEDICAL & SURGICAL HISTORY:  Obesity      Hyperlipidemia      Gastric bypass status for obesity  s/p Lap band      Sleep apnea      Hypertension      H/O gastric bypass  s/p Lap band          Vitals:  T(F): 97.9 (11-18), Max: 98.3 (11-17)  HR: 80 (11-18) (72 - 80)  BP: 128/86 (11-18) (116/76 - 149/93)  RR: 18 (11-18)  SpO2: 96% (11-18)  I&O's Summary    16 Nov 2022 07:01  -  17 Nov 2022 07:00  --------------------------------------------------------  IN: 920 mL / OUT: 3275 mL / NET: -2355 mL    17 Nov 2022 07:01  -  18 Nov 2022 06:14  --------------------------------------------------------  IN: 300 mL / OUT: 3800 mL / NET: -3500 mL        Physical Exam:  Appearance: No acute distress; well appearing  Eyes: PERRL, EOMI, pink conjunctiva  HENT: Normal oral mucosa  Cardiovascular: RRR, S1, S2, no murmurs, rubs, or gallops; 2+ pitting edema bilaterally  Respiratory: Clear to auscultation bilaterally  Gastrointestinal: soft, non-tender, non-distended with normal bowel sounds  Musculoskeletal: No clubbing; no joint deformity   Neurologic: Non-focal  Lymphatic: No lymphadenopathy  Psychiatry: AAOx3, mood & affect appropriate  Skin: No rashes, ecchymoses, or                           11.9   9.82  )-----------( 328      ( 17 Nov 2022 05:51 )             38.4     11-17    141  |  102  |  19  ----------------------------<  106<H>  4.2   |  25  |  1.19    Ca    9.6      17 Nov 2022 05:51  Phos  3.6     11-16  Mg     1.90     11-16    TPro  7.7  /  Alb  4.2  /  TBili  1.2  /  DBili  x   /  AST  22  /  ALT  19  /  AlkPhos  101  11-17          Serum Pro-Brain Natriuretic Peptide: 2729 pg/mL (11-15 @ 12:00)          New ECG(s): Personally reviewed    Echo:      OBSERVATIONS:  Mitral Valve: Mitral valve not well visualized.  Aortic Root: Normal aortic root.  Aortic Valve: Aortic valve leaflet morphology not well  visualized.  Left Atrium: Normal left atrium.  Left Ventricle: Endocardium not well visualized; grossly  severe global left ventricular systolic dysfunction.  Endocardial visualization enhanced with intravenous  injection of echo contrast (Definity).  No LV thrombus  seen.  Right Heart: Normal right atrium. The right ventricle is  not well visualized. Tricuspid valve not well visualized.  Pulmonic valve not well visualized.  Pericardium/PleuraNormal pericardium with no pericardial  effusion.  ------------------------------------------------------------------------  CONCLUSIONS:  Technically difficult study.  1. Endocardium not well visualized; grossly severe global  left ventricular systolic dysfunction.  Endocardial  visualization enhanced with intravenous injection of echo  contrast (Definity).  No LV thrombus seen.  2. The right ventricle is not well visualized.  *** No previous Echo exam.      Cath:    Diagnostic Conclusions:   Moderate-severe atherosclerosis in mid RCA. Mild atherosclerosis in Cx  and LAD.    Recommendations:   Aggressive risk factor modification. Optimize medical therapy for  nonischemic cardiomyopathy. Smoking cessation.     Patient seen and examined at bedside.    Overnight Events:   - On tele has bigeminy otherwise NSR  - Denies CP or SOB  - Endorses some orthopnea still        Current Meds:  acetaminophen     Tablet .. 650 milliGRAM(s) Oral every 6 hours PRN  atorvastatin 40 milliGRAM(s) Oral at bedtime  enoxaparin Injectable 60 milliGRAM(s) SubCutaneous every 12 hours  furosemide   Injectable 40 milliGRAM(s) IV Push two times a day  influenza   Vaccine 0.5 milliLiter(s) IntraMuscular once  losartan 25 milliGRAM(s) Oral daily  melatonin 3 milliGRAM(s) Oral at bedtime PRN  metoprolol succinate ER 50 milliGRAM(s) Oral daily  multivitamin 1 Tablet(s) Oral daily      PAST MEDICAL & SURGICAL HISTORY:  Obesity      Hyperlipidemia      Gastric bypass status for obesity  s/p Lap band      Sleep apnea      Hypertension      H/O gastric bypass  s/p Lap band          Vitals:  T(F): 97.9 (11-18), Max: 98.3 (11-17)  HR: 80 (11-18) (72 - 80)  BP: 128/86 (11-18) (116/76 - 149/93)  RR: 18 (11-18)  SpO2: 96% (11-18)  I&O's Summary    16 Nov 2022 07:01  -  17 Nov 2022 07:00  --------------------------------------------------------  IN: 920 mL / OUT: 3275 mL / NET: -2355 mL    17 Nov 2022 07:01  -  18 Nov 2022 06:14  --------------------------------------------------------  IN: 300 mL / OUT: 3800 mL / NET: -3500 mL        Physical Exam:  Appearance: No acute distress; well appearing  Eyes: PERRL, EOMI, pink conjunctiva  HENT: Normal oral mucosa  Cardiovascular: RRR, S1, S2, no murmurs, rubs, or gallops; 2+ pitting edema bilaterally  Procedural: right radial access C/D/I no TTP intact neurovascular function good pulse  Respiratory: Clear to auscultation bilaterally  Gastrointestinal: soft, non-tender, non-distended with normal bowel sounds  Musculoskeletal: No clubbing; no joint deformity   Neurologic: Non-focal  Lymphatic: No lymphadenopathy  Psychiatry: AAOx3, mood & affect appropriate  Skin: No rashes, ecchymoses, or                           11.9   9.82  )-----------( 328      ( 17 Nov 2022 05:51 )             38.4     11-17    141  |  102  |  19  ----------------------------<  106<H>  4.2   |  25  |  1.19    Ca    9.6      17 Nov 2022 05:51  Phos  3.6     11-16  Mg     1.90     11-16    TPro  7.7  /  Alb  4.2  /  TBili  1.2  /  DBili  x   /  AST  22  /  ALT  19  /  AlkPhos  101  11-17          Serum Pro-Brain Natriuretic Peptide: 2729 pg/mL (11-15 @ 12:00)          New ECG(s): Personally reviewed    Echo:      OBSERVATIONS:  Mitral Valve: Mitral valve not well visualized.  Aortic Root: Normal aortic root.  Aortic Valve: Aortic valve leaflet morphology not well  visualized.  Left Atrium: Normal left atrium.  Left Ventricle: Endocardium not well visualized; grossly  severe global left ventricular systolic dysfunction.  Endocardial visualization enhanced with intravenous  injection of echo contrast (Definity).  No LV thrombus  seen.  Right Heart: Normal right atrium. The right ventricle is  not well visualized. Tricuspid valve not well visualized.  Pulmonic valve not well visualized.  Pericardium/PleuraNormal pericardium with no pericardial  effusion.  ------------------------------------------------------------------------  CONCLUSIONS:  Technically difficult study.  1. Endocardium not well visualized; grossly severe global  left ventricular systolic dysfunction.  Endocardial  visualization enhanced with intravenous injection of echo  contrast (Definity).  No LV thrombus seen.  2. The right ventricle is not well visualized.  *** No previous Echo exam.      Cath:    Diagnostic Conclusions:   Moderate-severe atherosclerosis in mid RCA. Mild atherosclerosis in Cx  and LAD.    Recommendations:   Aggressive risk factor modification. Optimize medical therapy for  nonischemic cardiomyopathy. Smoking cessation.

## 2022-11-19 LAB
ANION GAP SERPL CALC-SCNC: 12 MMOL/L — SIGNIFICANT CHANGE UP (ref 7–14)
BUN SERPL-MCNC: 20 MG/DL — SIGNIFICANT CHANGE UP (ref 7–23)
CALCIUM SERPL-MCNC: 9.6 MG/DL — SIGNIFICANT CHANGE UP (ref 8.4–10.5)
CHLORIDE SERPL-SCNC: 98 MMOL/L — SIGNIFICANT CHANGE UP (ref 98–107)
CO2 SERPL-SCNC: 27 MMOL/L — SIGNIFICANT CHANGE UP (ref 22–31)
CREAT SERPL-MCNC: 1.13 MG/DL — SIGNIFICANT CHANGE UP (ref 0.5–1.3)
EGFR: 81 ML/MIN/1.73M2 — SIGNIFICANT CHANGE UP
FERRITIN SERPL-MCNC: 51 NG/ML — SIGNIFICANT CHANGE UP (ref 30–400)
GLUCOSE SERPL-MCNC: 93 MG/DL — SIGNIFICANT CHANGE UP (ref 70–99)
HCT VFR BLD CALC: 38.7 % — LOW (ref 39–50)
HGB BLD-MCNC: 12 G/DL — LOW (ref 13–17)
IRON SATN MFR SERPL: 11 % — LOW (ref 14–50)
IRON SATN MFR SERPL: 43 UG/DL — LOW (ref 45–165)
MAGNESIUM SERPL-MCNC: 2.3 MG/DL — SIGNIFICANT CHANGE UP (ref 1.6–2.6)
MCHC RBC-ENTMCNC: 24.5 PG — LOW (ref 27–34)
MCHC RBC-ENTMCNC: 31 GM/DL — LOW (ref 32–36)
MCV RBC AUTO: 79 FL — LOW (ref 80–100)
NRBC # BLD: 0 /100 WBCS — SIGNIFICANT CHANGE UP (ref 0–0)
NRBC # FLD: 0 K/UL — SIGNIFICANT CHANGE UP (ref 0–0)
PHOSPHATE SERPL-MCNC: 4.8 MG/DL — HIGH (ref 2.5–4.5)
PLATELET # BLD AUTO: 340 K/UL — SIGNIFICANT CHANGE UP (ref 150–400)
POTASSIUM SERPL-MCNC: 4.1 MMOL/L — SIGNIFICANT CHANGE UP (ref 3.5–5.3)
POTASSIUM SERPL-SCNC: 4.1 MMOL/L — SIGNIFICANT CHANGE UP (ref 3.5–5.3)
RBC # BLD: 4.9 M/UL — SIGNIFICANT CHANGE UP (ref 4.2–5.8)
RBC # FLD: 15.3 % — HIGH (ref 10.3–14.5)
SODIUM SERPL-SCNC: 137 MMOL/L — SIGNIFICANT CHANGE UP (ref 135–145)
TIBC SERPL-MCNC: 394 UG/DL — SIGNIFICANT CHANGE UP (ref 220–430)
UIBC SERPL-MCNC: 351 UG/DL — SIGNIFICANT CHANGE UP (ref 110–370)
WBC # BLD: 8.56 K/UL — SIGNIFICANT CHANGE UP (ref 3.8–10.5)
WBC # FLD AUTO: 8.56 K/UL — SIGNIFICANT CHANGE UP (ref 3.8–10.5)

## 2022-11-19 PROCEDURE — 99233 SBSQ HOSP IP/OBS HIGH 50: CPT

## 2022-11-19 PROCEDURE — 99232 SBSQ HOSP IP/OBS MODERATE 35: CPT

## 2022-11-19 RX ADMIN — Medication 40 MILLIGRAM(S): at 05:26

## 2022-11-19 RX ADMIN — Medication 81 MILLIGRAM(S): at 17:24

## 2022-11-19 RX ADMIN — Medication 3 MILLIGRAM(S): at 22:12

## 2022-11-19 RX ADMIN — LOSARTAN POTASSIUM 25 MILLIGRAM(S): 100 TABLET, FILM COATED ORAL at 05:26

## 2022-11-19 RX ADMIN — Medication 50 MILLIGRAM(S): at 05:26

## 2022-11-19 RX ADMIN — Medication 40 MILLIGRAM(S): at 17:23

## 2022-11-19 RX ADMIN — Medication 1 TABLET(S): at 17:23

## 2022-11-19 RX ADMIN — ENOXAPARIN SODIUM 60 MILLIGRAM(S): 100 INJECTION SUBCUTANEOUS at 05:26

## 2022-11-19 RX ADMIN — ATORVASTATIN CALCIUM 80 MILLIGRAM(S): 80 TABLET, FILM COATED ORAL at 22:12

## 2022-11-19 RX ADMIN — ENOXAPARIN SODIUM 60 MILLIGRAM(S): 100 INJECTION SUBCUTANEOUS at 17:23

## 2022-11-19 NOTE — PROGRESS NOTE ADULT - PROBLEM SELECTOR PLAN 3
previously on rosuvastatin 20mg  - lipid panel with LDL of 141  - started atorvastatin 80mg, ASA 81mg

## 2022-11-19 NOTE — PROGRESS NOTE ADULT - ASSESSMENT
46M with PMH of HTN, presented to the ED for worsening SOB, diagnosed acute CHF exacerbation with TTE showing severe global LV dysfunction.

## 2022-11-19 NOTE — PROGRESS NOTE ADULT - SUBJECTIVE AND OBJECTIVE BOX
Patient seen and examined at bedside.    Overnight Events:   No significant events   Feels better, breathing better     REVIEW OF SYSTEMS:  Constitutional:     [x ] negative [ ] fevers [ ] chills [ ] weight loss [ ] weight gain  HEENT:                  [x ] negative [ ] dry eyes [ ] eye irritation [ ] postnasal drip [ ] nasal congestion  CV:                         [ x] negative  [ ] chest pain [ ] orthopnea [ ] palpitations [ ] murmur  Resp:                     [ x] negative [ ] cough [ ] shortness of breath [ ] dyspnea [ ] wheezing [ ] sputum [ ]hemoptysis  GI:                          [ x] negative [ ] nausea [ ] vomiting [ ] diarrhea [ ] constipation [ ] abd pain [ ] dysphagia   :                        [ x] negative [ ] dysuria [ ] nocturia [ ] hematuria [ ] increased urinary frequency  Musculoskeletal: [ x] negative [ ] back pain [ ] myalgias [ ] arthralgias [ ] fracture  Skin:                       [ x] negative [ ] rash [ ] itch  Neurological:        [x ] negative [ ] headache [ ] dizziness [ ] syncope [ ] weakness [ ] numbness  Psychiatric:           [ x] negative [ ] anxiety [ ] depression  Endocrine:            [ x] negative [ ] diabetes [ ] thyroid problem  Heme/Lymph:      [ x] negative [ ] anemia [ ] bleeding problem  Allergic/Immune: [ x] negative [ ] itchy eyes [ ] nasal discharge [ ] hives [ ] angioedema    [ x] All other systems negative  [ ] Unable to assess ROS due to    Current Meds:  acetaminophen     Tablet .. 650 milliGRAM(s) Oral every 6 hours PRN  aspirin  chewable 81 milliGRAM(s) Oral daily  atorvastatin 80 milliGRAM(s) Oral at bedtime  enoxaparin Injectable 60 milliGRAM(s) SubCutaneous every 12 hours  furosemide   Injectable 40 milliGRAM(s) IV Push two times a day  influenza   Vaccine 0.5 milliLiter(s) IntraMuscular once  losartan 25 milliGRAM(s) Oral daily  melatonin 3 milliGRAM(s) Oral at bedtime PRN  metoprolol succinate ER 50 milliGRAM(s) Oral daily  multivitamin 1 Tablet(s) Oral daily      PAST MEDICAL & SURGICAL HISTORY:  Obesity      Hyperlipidemia      Gastric bypass status for obesity  s/p Lap band      Sleep apnea      Hypertension      H/O gastric bypass  s/p Lap band          Vitals:  T(F): 97.4 (11-19), Max: 98.3 (11-18)  HR: 68 (11-19) (67 - 81)  BP: 124/80 (11-19) (116/61 - 126/82)  RR: 18 (11-19)  SpO2: 96% (11-19)  I&O's Summary    18 Nov 2022 07:01  -  19 Nov 2022 07:00  --------------------------------------------------------  IN: 850 mL / OUT: 3000 mL / NET: -2150 mL        Physical Exam:  Appearance: No acute distress; well appearing  Eyes: PERRL, EOMI, pink conjunctiva  HENT: Normal oral mucosa  Cardiovascular: RRR, S1, S2, no murmurs, rubs, or gallops; 2+ pitting edema bilaterally  Procedural: right radial access C/D/I no TTP intact neurovascular function good pulse  Respiratory: Clear to auscultation bilaterally  Gastrointestinal: soft, non-tender, non-distended with normal bowel sounds  Musculoskeletal: No clubbing; no joint deformity   Neurologic: Non-focal  Lymphatic: No lymphadenopathy  Psychiatry: AAOx3, mood & affect appropriate  Skin: No rashes, ecchymoses, or                             12.0   8.56  )-----------( 340      ( 19 Nov 2022 06:16 )             38.7     11-19    137  |  98  |  20  ----------------------------<  93  4.1   |  27  |  1.13    Ca    9.6      19 Nov 2022 06:16  Phos  4.8     11-19  Mg     2.30     11-19            Serum Pro-Brain Natriuretic Peptide: 2729 pg/mL (11-15 @ 12:00)          Cardiovascular Testings:       Interpretation of Telemetry: SR with PVCs

## 2022-11-19 NOTE — PROGRESS NOTE ADULT - SUBJECTIVE AND OBJECTIVE BOX
[facility] Division of Hospital Medicine  Pauline Bentley MD  Pager: [phone]      Patient is a 46y old  Male who presents with a chief complaint of shortness of breath (19 Nov 2022 08:43)      SUBJECTIVE / OVERNIGHT EVENTS: patient seen and examined. States his breathing is getting better and he has been ambulating down the halls without chest pain. Also w/ LE swelling.     MEDICATIONS  (STANDING):  aspirin  chewable 81 milliGRAM(s) Oral daily  atorvastatin 80 milliGRAM(s) Oral at bedtime  enoxaparin Injectable 60 milliGRAM(s) SubCutaneous every 12 hours  furosemide   Injectable 40 milliGRAM(s) IV Push two times a day  influenza   Vaccine 0.5 milliLiter(s) IntraMuscular once  losartan 25 milliGRAM(s) Oral daily  metoprolol succinate ER 50 milliGRAM(s) Oral daily  multivitamin 1 Tablet(s) Oral daily    MEDICATIONS  (PRN):  acetaminophen     Tablet .. 650 milliGRAM(s) Oral every 6 hours PRN Temp greater or equal to 38C (100.4F), Mild Pain (1 - 3)  melatonin 3 milliGRAM(s) Oral at bedtime PRN Insomnia      CAPILLARY BLOOD GLUCOSE        I&O's Summary    18 Nov 2022 07:01  -  19 Nov 2022 07:00  --------------------------------------------------------  IN: 850 mL / OUT: 3000 mL / NET: -2150 mL        PHYSICAL EXAM:  Vital Signs Last 24 Hrs  T(C): 36.4 (19 Nov 2022 12:14), Max: 36.8 (18 Nov 2022 21:30)  T(F): 97.6 (19 Nov 2022 12:14), Max: 98.3 (18 Nov 2022 21:30)  HR: 77 (19 Nov 2022 12:14) (67 - 81)  BP: 121/82 (19 Nov 2022 12:14) (116/61 - 126/81)  BP(mean): --  RR: 18 (19 Nov 2022 12:14) (18 - 18)  SpO2: 97% (19 Nov 2022 12:14) (95% - 98%)    Parameters below as of 19 Nov 2022 12:14  Patient On (Oxygen Delivery Method): room air        CONSTITUTIONAL: obese male in NAD, well-developed, well-groomed  ENMT: Moist oral mucosa  RESPIRATORY: Normal respiratory effort; lungs are clear to auscultation bilaterally  CARDIOVASCULAR:  S1/S2; 2+ lower extremity edema  ABDOMEN: Nontender to palpation, normoactive bowel sounds, no rebound/guarding  MUSCULOSKELETAL:  no clubbing or cyanosis of digits; no joint swelling or tenderness to palpation  PSYCH: A+O to person, place, and time; affect appropriate  NEUROLOGY: no focal deficits  SKIN: No rashes; no palpable lesions    LABS:                        12.0   8.56  )-----------( 340      ( 19 Nov 2022 06:16 )             38.7     11-19    137  |  98  |  20  ----------------------------<  93  4.1   |  27  |  1.13    Ca    9.6      19 Nov 2022 06:16  Phos  4.8     11-19  Mg     2.30     11-19

## 2022-11-19 NOTE — PROGRESS NOTE ADULT - ASSESSMENT
46M with PMH of HTN, HLD, obesity, DESMOND, strong family hx of CAD, and prior COVID p/w new decompensated HFrEF with LHC showing CAD out of proportion to his CM     NICM   -cont Toprol XL 50 mg QD, Losartan 25 mg QD, and cont IV lasix 40 mg BID   -diuresing well on IV lasix 40 mg BID, BMP/Mg BID and replete as needed   -cMRI for evaluation of other etiologies and LGE burden if any   - ICD evaluation as outpatient   - transition to [facility], likely Monday     Thank you for allowing us to participate in the care of your patient. If you have any questions or concerns please do not hesitate to contact us 24/7.     All Cardiology service information can be found 24/7 on [email or web address], password: [email or web address] MD  PGY-6 Cardiology Fellow, [facility]

## 2022-11-19 NOTE — PROGRESS NOTE ADULT - PROBLEM SELECTOR PLAN 1
# Non-ischemic cardiomyopathy   - patient presented for worsening of shortness of breath with exertion, noted to be severely hypervolemic   - BNP 2749,  CXR shows new pulmonary congestion  - CTA is neg for PE  - troponins neg x2, EKG shows NSR no ST elevations or TWI  - TTE with poor windows due to body habitus, but showing severe global LV dysfunction   - LHC with mild atherosclerosis in Cx and LAD and mod-severe disease of RCA.   - BLE dopplers negative   - c/w IV lasix 40mg BID  - GDMT: metoprolol succinate 25mg daily, and losartan 25 mg daily; plan to start Entresto on Monday  - cardiology consulted appreciate recs  - Unable to obtain inpatient cardiac MRI *weight limit is 350lbs, will need to be done as outpatient   - daily weights, strict I/Os, fluid restrict 1L/day, DASH diet, nutrition consult

## 2022-11-20 LAB
ANION GAP SERPL CALC-SCNC: 15 MMOL/L — HIGH (ref 7–14)
BUN SERPL-MCNC: 25 MG/DL — HIGH (ref 7–23)
CALCIUM SERPL-MCNC: 9.5 MG/DL — SIGNIFICANT CHANGE UP (ref 8.4–10.5)
CHLORIDE SERPL-SCNC: 95 MMOL/L — LOW (ref 98–107)
CO2 SERPL-SCNC: 26 MMOL/L — SIGNIFICANT CHANGE UP (ref 22–31)
CREAT SERPL-MCNC: 1.17 MG/DL — SIGNIFICANT CHANGE UP (ref 0.5–1.3)
EGFR: 78 ML/MIN/1.73M2 — SIGNIFICANT CHANGE UP
GLUCOSE SERPL-MCNC: 90 MG/DL — SIGNIFICANT CHANGE UP (ref 70–99)
HCT VFR BLD CALC: 43.5 % — SIGNIFICANT CHANGE UP (ref 39–50)
HGB BLD-MCNC: 12.9 G/DL — LOW (ref 13–17)
MAGNESIUM SERPL-MCNC: 2.3 MG/DL — SIGNIFICANT CHANGE UP (ref 1.6–2.6)
MCHC RBC-ENTMCNC: 24.6 PG — LOW (ref 27–34)
MCHC RBC-ENTMCNC: 29.7 GM/DL — LOW (ref 32–36)
MCV RBC AUTO: 82.9 FL — SIGNIFICANT CHANGE UP (ref 80–100)
NRBC # BLD: 0 /100 WBCS — SIGNIFICANT CHANGE UP (ref 0–0)
NRBC # FLD: 0 K/UL — SIGNIFICANT CHANGE UP (ref 0–0)
PHOSPHATE SERPL-MCNC: 4.4 MG/DL — SIGNIFICANT CHANGE UP (ref 2.5–4.5)
PLATELET # BLD AUTO: 341 K/UL — SIGNIFICANT CHANGE UP (ref 150–400)
POTASSIUM SERPL-MCNC: 3.6 MMOL/L — SIGNIFICANT CHANGE UP (ref 3.5–5.3)
POTASSIUM SERPL-SCNC: 3.6 MMOL/L — SIGNIFICANT CHANGE UP (ref 3.5–5.3)
RBC # BLD: 5.25 M/UL — SIGNIFICANT CHANGE UP (ref 4.2–5.8)
RBC # FLD: 15.7 % — HIGH (ref 10.3–14.5)
SODIUM SERPL-SCNC: 136 MMOL/L — SIGNIFICANT CHANGE UP (ref 135–145)
WBC # BLD: 8.11 K/UL — SIGNIFICANT CHANGE UP (ref 3.8–10.5)
WBC # FLD AUTO: 8.11 K/UL — SIGNIFICANT CHANGE UP (ref 3.8–10.5)

## 2022-11-20 PROCEDURE — 99232 SBSQ HOSP IP/OBS MODERATE 35: CPT

## 2022-11-20 RX ADMIN — ENOXAPARIN SODIUM 60 MILLIGRAM(S): 100 INJECTION SUBCUTANEOUS at 06:15

## 2022-11-20 RX ADMIN — Medication 1 TABLET(S): at 11:43

## 2022-11-20 RX ADMIN — ENOXAPARIN SODIUM 60 MILLIGRAM(S): 100 INJECTION SUBCUTANEOUS at 17:14

## 2022-11-20 RX ADMIN — ATORVASTATIN CALCIUM 80 MILLIGRAM(S): 80 TABLET, FILM COATED ORAL at 21:23

## 2022-11-20 RX ADMIN — Medication 40 MILLIGRAM(S): at 06:14

## 2022-11-20 RX ADMIN — LOSARTAN POTASSIUM 25 MILLIGRAM(S): 100 TABLET, FILM COATED ORAL at 06:14

## 2022-11-20 RX ADMIN — Medication 40 MILLIGRAM(S): at 17:14

## 2022-11-20 RX ADMIN — Medication 81 MILLIGRAM(S): at 11:43

## 2022-11-20 RX ADMIN — Medication 50 MILLIGRAM(S): at 06:15

## 2022-11-20 NOTE — PROGRESS NOTE ADULT - SUBJECTIVE AND OBJECTIVE BOX
[facility] Division of Hospital Medicine  Pauline Bentley MD  Pager: [phone]      Patient is a 46y old  Male who presents with a chief complaint of shortness of breath (19 Nov 2022 13:08)      SUBJECTIVE / OVERNIGHT EVENTS: Patient seen and examined, states he feels a little better. Able to walk down the [place] more and SOB is improving. Still urinating a lot.     MEDICATIONS  (STANDING):  aspirin  chewable 81 milliGRAM(s) Oral daily  atorvastatin 80 milliGRAM(s) Oral at bedtime  enoxaparin Injectable 60 milliGRAM(s) SubCutaneous every 12 hours  furosemide   Injectable 40 milliGRAM(s) IV Push two times a day  influenza   Vaccine 0.5 milliLiter(s) IntraMuscular once  losartan 25 milliGRAM(s) Oral daily  metoprolol succinate ER 50 milliGRAM(s) Oral daily  multivitamin 1 Tablet(s) Oral daily    MEDICATIONS  (PRN):  acetaminophen     Tablet .. 650 milliGRAM(s) Oral every 6 hours PRN Temp greater or equal to 38C (100.4F), Mild Pain (1 - 3)  melatonin 3 milliGRAM(s) Oral at bedtime PRN Insomnia      CAPILLARY BLOOD GLUCOSE        I&O's Summary    19 Nov 2022 07:01  -  20 Nov 2022 07:00  --------------------------------------------------------  IN: 1350 mL / OUT: 3300 mL / NET: -1950 mL    20 Nov 2022 07:01  -  20 Nov 2022 13:08  --------------------------------------------------------  IN: 240 mL / OUT: 900 mL / NET: -660 mL        PHYSICAL EXAM:  Vital Signs Last 24 Hrs  T(C): 36.5 (20 Nov 2022 12:56), Max: 36.5 (20 Nov 2022 12:56)  T(F): 97.7 (20 Nov 2022 12:56), Max: 97.7 (20 Nov 2022 12:56)  HR: 63 (20 Nov 2022 12:56) (63 - 81)  BP: 101/63 (20 Nov 2022 12:56) (101/63 - 124/68)  BP(mean): --  RR: 18 (20 Nov 2022 12:56) (16 - 18)  SpO2: 97% (20 Nov 2022 12:56) (96% - 99%)    Parameters below as of 20 Nov 2022 12:56  Patient On (Oxygen Delivery Method): room air      CONSTITUTIONAL: obese male in NAD, well-developed, well-groomed  ENMT: Moist oral mucosa  RESPIRATORY: Normal respiratory effort; lungs are clear to auscultation bilaterally  CARDIOVASCULAR:  S1/S2; 2+ lower extremity edema  ABDOMEN: Nontender to palpation, normoactive bowel sounds, no rebound/guarding  MUSCULOSKELETAL:  no clubbing or cyanosis of digits; no joint swelling or tenderness to palpation  PSYCH: A+O to person, place, and time; affect appropriate  NEUROLOGY: no focal deficits  SKIN: No rashes; no palpable lesions    LABS:                        12.9   8.11  )-----------( 341      ( 20 Nov 2022 06:55 )             43.5     11-20    136  |  95<L>  |  25<H>  ----------------------------<  90  3.6   |  26  |  1.17    Ca    9.5      20 Nov 2022 06:55  Phos  4.4     11-20  Mg     2.30     11-20

## 2022-11-21 LAB
ANION GAP SERPL CALC-SCNC: 13 MMOL/L — SIGNIFICANT CHANGE UP (ref 7–14)
BUN SERPL-MCNC: 30 MG/DL — HIGH (ref 7–23)
CALCIUM SERPL-MCNC: 9 MG/DL — SIGNIFICANT CHANGE UP (ref 8.4–10.5)
CHLORIDE SERPL-SCNC: 99 MMOL/L — SIGNIFICANT CHANGE UP (ref 98–107)
CO2 SERPL-SCNC: 26 MMOL/L — SIGNIFICANT CHANGE UP (ref 22–31)
CREAT SERPL-MCNC: 1.23 MG/DL — SIGNIFICANT CHANGE UP (ref 0.5–1.3)
EGFR: 73 ML/MIN/1.73M2 — SIGNIFICANT CHANGE UP
GLUCOSE SERPL-MCNC: 98 MG/DL — SIGNIFICANT CHANGE UP (ref 70–99)
HCT VFR BLD CALC: 41.3 % — SIGNIFICANT CHANGE UP (ref 39–50)
HGB BLD-MCNC: 12.5 G/DL — LOW (ref 13–17)
MAGNESIUM SERPL-MCNC: 2.4 MG/DL — SIGNIFICANT CHANGE UP (ref 1.6–2.6)
MCHC RBC-ENTMCNC: 24.8 PG — LOW (ref 27–34)
MCHC RBC-ENTMCNC: 30.3 GM/DL — LOW (ref 32–36)
MCV RBC AUTO: 81.9 FL — SIGNIFICANT CHANGE UP (ref 80–100)
NRBC # BLD: 0 /100 WBCS — SIGNIFICANT CHANGE UP (ref 0–0)
NRBC # FLD: 0 K/UL — SIGNIFICANT CHANGE UP (ref 0–0)
PHOSPHATE SERPL-MCNC: 4.5 MG/DL — SIGNIFICANT CHANGE UP (ref 2.5–4.5)
PLATELET # BLD AUTO: 297 K/UL — SIGNIFICANT CHANGE UP (ref 150–400)
POTASSIUM SERPL-MCNC: 4 MMOL/L — SIGNIFICANT CHANGE UP (ref 3.5–5.3)
POTASSIUM SERPL-SCNC: 4 MMOL/L — SIGNIFICANT CHANGE UP (ref 3.5–5.3)
RBC # BLD: 5.04 M/UL — SIGNIFICANT CHANGE UP (ref 4.2–5.8)
RBC # FLD: 15.4 % — HIGH (ref 10.3–14.5)
SODIUM SERPL-SCNC: 138 MMOL/L — SIGNIFICANT CHANGE UP (ref 135–145)
WBC # BLD: 9.69 K/UL — SIGNIFICANT CHANGE UP (ref 3.8–10.5)
WBC # FLD AUTO: 9.69 K/UL — SIGNIFICANT CHANGE UP (ref 3.8–10.5)

## 2022-11-21 PROCEDURE — 99232 SBSQ HOSP IP/OBS MODERATE 35: CPT | Mod: GC

## 2022-11-21 PROCEDURE — 99232 SBSQ HOSP IP/OBS MODERATE 35: CPT

## 2022-11-21 RX ORDER — SACUBITRIL AND VALSARTAN 24; 26 MG/1; MG/1
1 TABLET, FILM COATED ORAL
Refills: 0 | Status: DISCONTINUED | OUTPATIENT
Start: 2022-11-21 | End: 2022-11-23

## 2022-11-21 RX ORDER — METOPROLOL TARTRATE 50 MG
75 TABLET ORAL DAILY
Refills: 0 | Status: DISCONTINUED | OUTPATIENT
Start: 2022-11-21 | End: 2022-11-23

## 2022-11-21 RX ADMIN — ATORVASTATIN CALCIUM 80 MILLIGRAM(S): 80 TABLET, FILM COATED ORAL at 21:36

## 2022-11-21 RX ADMIN — Medication 81 MILLIGRAM(S): at 17:55

## 2022-11-21 RX ADMIN — Medication 1 TABLET(S): at 17:55

## 2022-11-21 RX ADMIN — ENOXAPARIN SODIUM 60 MILLIGRAM(S): 100 INJECTION SUBCUTANEOUS at 17:55

## 2022-11-21 RX ADMIN — Medication 40 MILLIGRAM(S): at 17:56

## 2022-11-21 RX ADMIN — ENOXAPARIN SODIUM 60 MILLIGRAM(S): 100 INJECTION SUBCUTANEOUS at 05:25

## 2022-11-21 RX ADMIN — SACUBITRIL AND VALSARTAN 1 TABLET(S): 24; 26 TABLET, FILM COATED ORAL at 17:55

## 2022-11-21 RX ADMIN — Medication 50 MILLIGRAM(S): at 05:26

## 2022-11-21 RX ADMIN — LOSARTAN POTASSIUM 25 MILLIGRAM(S): 100 TABLET, FILM COATED ORAL at 05:26

## 2022-11-21 RX ADMIN — Medication 40 MILLIGRAM(S): at 05:25

## 2022-11-21 NOTE — CHART NOTE - NSCHARTNOTEFT_GEN_A_CORE
Nutrition Note:    Pt f/u for diet education. Provided written handout on Low na low cholesterol diet and reviewed list of foods to consume and avoid. Pt seem to understand and good compliance expected. Will remain available as needed.    Onelia Schmidt RDN #[id number]

## 2022-11-21 NOTE — PROGRESS NOTE ADULT - SUBJECTIVE AND OBJECTIVE BOX
Monica Sánchez MD  Cardiology Fellow  [phone]  All Cardiology service information can be found 24/7 on [email or web address], password: [email or web address]    Patient seen and examined at bedside.    Overnight Events:   NAEON     Review Of Systems: No chest pain, shortness of breath, or palpitations            Current Meds:  acetaminophen     Tablet .. 650 milliGRAM(s) Oral every 6 hours PRN  aspirin  chewable 81 milliGRAM(s) Oral daily  atorvastatin 80 milliGRAM(s) Oral at bedtime  enoxaparin Injectable 60 milliGRAM(s) SubCutaneous every 12 hours  furosemide   Injectable 40 milliGRAM(s) IV Push two times a day  influenza   Vaccine 0.5 milliLiter(s) IntraMuscular once  losartan 25 milliGRAM(s) Oral daily  melatonin 3 milliGRAM(s) Oral at bedtime PRN  metoprolol succinate ER 50 milliGRAM(s) Oral daily  multivitamin 1 Tablet(s) Oral daily      Vitals:  T(F): 98.5 (11-21), Max: 98.5 (11-21)  HR: 73 (11-21) (63 - 80)  BP: 112/76 (11-21) (101/63 - 137/65)  RR: 18 (11-21)  SpO2: 99% (11-21)  I&O's Summary    20 Nov 2022 07:01  -  21 Nov 2022 07:00  --------------------------------------------------------  IN: 340 mL / OUT: 2800 mL / NET: -2460 mL        Physical Exam:  Appearance: No acute distress; well appearing  Eyes: PERRL, EOMI, pink conjunctiva  HENT: Normal oral mucosa  Cardiovascular: RRR, S1, S2, no murmurs, rubs, or gallops; 2+ pitting edema bilaterally  Respiratory: Bi-basilar crackles   Gastrointestinal: soft, non-tender, non-distended with normal bowel sounds  Musculoskeletal: No clubbing; no joint deformity   Neurologic: Non-focal  Lymphatic: No lymphadenopathy  Psychiatry: AAOx3, mood & affect appropriate  Skin: No rashes, ecchymoses, or                           12.5   9.69  )-----------( 297      ( 21 Nov 2022 07:00 )             41.3     11-21    138  |  99  |  30<H>  ----------------------------<  98  4.0   |  26  |  1.23    Ca    9.0      21 Nov 2022 07:00  Phos  4.5     11-21  Mg     2.40     11-21        CARDIAC MARKERS ( 15 Nov 2022 14:00 )  23 ng/L / x     / x     / x     / x     / x      CARDIAC MARKERS ( 15 Nov 2022 12:00 )  23 ng/L / x     / x     / x     / x     / x          Serum Pro-Brain Natriuretic Peptide: 2729 pg/mL (11-15 @ 12:00)          New ECG(s): Personally reviewed      Interpretation of Telemetry:  SR with PVCs

## 2022-11-21 NOTE — PROGRESS NOTE ADULT - ASSESSMENT
46M with PMH of HTN, HLD, obesity, DESMOND, strong family hx of CAD, and prior COVID p/w new decompensated HFrEF with LHC showing CAD out of proportion to his CM. Possible stress induced CM in setting of COVID    #NICM   -C/w Lasix 40 mg IV BID   -Increase metoprolol ER 75   -C/w atorvastatin   -C/w ASA   -C/w ARB, would see if entresto is covered

## 2022-11-21 NOTE — PROGRESS NOTE ADULT - PROBLEM SELECTOR PLAN 1
# Non-ischemic cardiomyopathy   - patient presented for worsening of shortness of breath with exertion, noted to be severely hypervolemic   - BNP 2749,  CXR shows new pulmonary congestion  - CTA is neg for PE  - troponins neg x2, EKG shows NSR no ST elevations or TWI  - TTE with poor windows due to body habitus, but showing severe global LV dysfunction   - LHC with mild atherosclerosis in Cx and LAD and mod-severe disease of RCA - findings out of proportion to advanced CHF  - BLE dopplers negative   - c/w IV lasix 40mg BID - suspect can transition to po later today or brianna - f.u cards   - GDMT: metoprolol succinate 25mg daily, and losartan 25 mg daily; plan to start Entresto today  - out-pt AICD eval per cards   - cardiology consulted appreciate recs  - Unable to obtain inpatient cardiac MRI *weight limit is 350lbs, will need to be done as outpatient   - daily weights, strict I/Os, fluid restrict 1L/day, DASH diet, nutrition consult

## 2022-11-21 NOTE — PROGRESS NOTE ADULT - PROBLEM SELECTOR PLAN 2
- previously on losartan-HCTZ, but reported not taking any medications recently   - reestablished care with PCP week PTA   - c/w metoprolol succinate 25mg daily, and losartan 25 mg daily  - Entresto per cards

## 2022-11-21 NOTE — PROGRESS NOTE ADULT - SUBJECTIVE AND OBJECTIVE BOX
Patient is a 46y old  Male who presents with a chief complaint of shortness of breath (20 Nov 2022 13:08)      SUBJECTIVE / OVERNIGHT EVENTS: pt feels better with improved SOB - is walking the hallways, SATs are stable, LE swelling has improved     ROS:  No HA/DZ  No Vision changes   No CP  No N/V/D  No Edema  No Rash  NO weakness, numbness    MEDICATIONS  (STANDING):  aspirin  chewable 81 milliGRAM(s) Oral daily  atorvastatin 80 milliGRAM(s) Oral at bedtime  enoxaparin Injectable 60 milliGRAM(s) SubCutaneous every 12 hours  furosemide   Injectable 40 milliGRAM(s) IV Push two times a day  influenza   Vaccine 0.5 milliLiter(s) IntraMuscular once  losartan 25 milliGRAM(s) Oral daily  metoprolol succinate ER 50 milliGRAM(s) Oral daily  multivitamin 1 Tablet(s) Oral daily    MEDICATIONS  (PRN):  acetaminophen     Tablet .. 650 milliGRAM(s) Oral every 6 hours PRN Temp greater or equal to 38C (100.4F), Mild Pain (1 - 3)  melatonin 3 milliGRAM(s) Oral at bedtime PRN Insomnia      T(C): 36.9 (11-21-22 @ 11:17)  HR: 73 (11-21-22 @ 11:17)  BP: 112/76 (11-21-22 @ 11:17)  RR: 18 (11-21-22 @ 11:17)  SpO2: 99% (11-21-22 @ 11:17)  CAPILLARY BLOOD GLUCOSE        I&O's Summary    20 Nov 2022 07:01  -  21 Nov 2022 07:00  --------------------------------------------------------  IN: 340 mL / OUT: 2800 mL / NET: -2460 mL        PHYSICAL EXAM:  GENERAL: NAD, obese, AOx3  HEAD:  Atraumatic, Normocephalic  EYES: EOMI, PERRL, conjunctiva and sclera clear  NECK: Supple, No JVD  CHEST/LUNG: Clear to auscultation bilaterally  HEART: Regular rate and rhythm; No murmurs, rubs, or gallops, +1 Edema  ABDOMEN: Soft, Nontender, Nondistended; Bowel sounds present  EXTREMITIES:  2+ Peripheral Pulses, No clubbing, cyanosis  PSYCH: No SI/HI  NEUROLOGY: non-focal  SKIN: No rashes or lesions    LABS:                        12.5   9.69  )-----------( 297      ( 21 Nov 2022 07:00 )             41.3     11-21    138  |  99  |  30<H>  ----------------------------<  98  4.0   |  26  |  1.23    Ca    9.0      21 Nov 2022 07:00  Phos  4.5     11-21  Mg     2.40     11-21                    RADIOLOGY & ADDITIONAL TESTS:    Imaging Personally Reviewed:    Consultant(s) Notes Reviewed:      Care Discussed with Consultants/Other Providers:

## 2022-11-22 ENCOUNTER — TRANSCRIPTION ENCOUNTER (OUTPATIENT)
Age: 47
End: 2022-11-22

## 2022-11-22 LAB
ANION GAP SERPL CALC-SCNC: 13 MMOL/L — SIGNIFICANT CHANGE UP (ref 7–14)
BUN SERPL-MCNC: 26 MG/DL — HIGH (ref 7–23)
CALCIUM SERPL-MCNC: 8.9 MG/DL — SIGNIFICANT CHANGE UP (ref 8.4–10.5)
CHLORIDE SERPL-SCNC: 98 MMOL/L — SIGNIFICANT CHANGE UP (ref 98–107)
CO2 SERPL-SCNC: 26 MMOL/L — SIGNIFICANT CHANGE UP (ref 22–31)
CREAT SERPL-MCNC: 1.05 MG/DL — SIGNIFICANT CHANGE UP (ref 0.5–1.3)
EGFR: 89 ML/MIN/1.73M2 — SIGNIFICANT CHANGE UP
GLUCOSE SERPL-MCNC: 101 MG/DL — HIGH (ref 70–99)
HCT VFR BLD CALC: 41.5 % — SIGNIFICANT CHANGE UP (ref 39–50)
HGB BLD-MCNC: 12.8 G/DL — LOW (ref 13–17)
MAGNESIUM SERPL-MCNC: 2.6 MG/DL — SIGNIFICANT CHANGE UP (ref 1.6–2.6)
MCHC RBC-ENTMCNC: 24.8 PG — LOW (ref 27–34)
MCHC RBC-ENTMCNC: 30.8 GM/DL — LOW (ref 32–36)
MCV RBC AUTO: 80.3 FL — SIGNIFICANT CHANGE UP (ref 80–100)
NRBC # BLD: 0 /100 WBCS — SIGNIFICANT CHANGE UP (ref 0–0)
NRBC # FLD: 0 K/UL — SIGNIFICANT CHANGE UP (ref 0–0)
PHOSPHATE SERPL-MCNC: 3.5 MG/DL — SIGNIFICANT CHANGE UP (ref 2.5–4.5)
PLATELET # BLD AUTO: 292 K/UL — SIGNIFICANT CHANGE UP (ref 150–400)
POTASSIUM SERPL-MCNC: 4 MMOL/L — SIGNIFICANT CHANGE UP (ref 3.5–5.3)
POTASSIUM SERPL-SCNC: 4 MMOL/L — SIGNIFICANT CHANGE UP (ref 3.5–5.3)
RBC # BLD: 5.17 M/UL — SIGNIFICANT CHANGE UP (ref 4.2–5.8)
RBC # FLD: 15.1 % — HIGH (ref 10.3–14.5)
SODIUM SERPL-SCNC: 137 MMOL/L — SIGNIFICANT CHANGE UP (ref 135–145)
WBC # BLD: 8.5 K/UL — SIGNIFICANT CHANGE UP (ref 3.8–10.5)
WBC # FLD AUTO: 8.5 K/UL — SIGNIFICANT CHANGE UP (ref 3.8–10.5)

## 2022-11-22 PROCEDURE — 99232 SBSQ HOSP IP/OBS MODERATE 35: CPT | Mod: GC

## 2022-11-22 PROCEDURE — 99232 SBSQ HOSP IP/OBS MODERATE 35: CPT

## 2022-11-22 RX ORDER — FUROSEMIDE 40 MG
40 TABLET ORAL
Refills: 0 | Status: DISCONTINUED | OUTPATIENT
Start: 2022-11-22 | End: 2022-11-22

## 2022-11-22 RX ORDER — FUROSEMIDE 40 MG
40 TABLET ORAL
Refills: 0 | Status: DISCONTINUED | OUTPATIENT
Start: 2022-11-22 | End: 2022-11-23

## 2022-11-22 RX ADMIN — ENOXAPARIN SODIUM 60 MILLIGRAM(S): 100 INJECTION SUBCUTANEOUS at 17:25

## 2022-11-22 RX ADMIN — Medication 75 MILLIGRAM(S): at 06:11

## 2022-11-22 RX ADMIN — SACUBITRIL AND VALSARTAN 1 TABLET(S): 24; 26 TABLET, FILM COATED ORAL at 06:11

## 2022-11-22 RX ADMIN — Medication 81 MILLIGRAM(S): at 17:26

## 2022-11-22 RX ADMIN — Medication 40 MILLIGRAM(S): at 17:25

## 2022-11-22 RX ADMIN — ENOXAPARIN SODIUM 60 MILLIGRAM(S): 100 INJECTION SUBCUTANEOUS at 06:11

## 2022-11-22 RX ADMIN — Medication 40 MILLIGRAM(S): at 06:12

## 2022-11-22 RX ADMIN — Medication 1 TABLET(S): at 17:26

## 2022-11-22 RX ADMIN — ATORVASTATIN CALCIUM 80 MILLIGRAM(S): 80 TABLET, FILM COATED ORAL at 21:07

## 2022-11-22 RX ADMIN — SACUBITRIL AND VALSARTAN 1 TABLET(S): 24; 26 TABLET, FILM COATED ORAL at 17:25

## 2022-11-22 NOTE — DISCHARGE NOTE NURSING/CASE MANAGEMENT/SOCIAL WORK - PATIENT PORTAL LINK FT
You can access the FollowMyHealth Patient Portal offered by [facility] by registering at the following website: [email or web address]. By joining [facility]’s FollowMyHealth portal, you will also be able to view your health information using other applications (apps) compatible with our system.

## 2022-11-22 NOTE — PROGRESS NOTE ADULT - PROBLEM SELECTOR PLAN 1
# Non-ischemic cardiomyopathy   - patient presented for worsening of shortness of breath with exertion, noted to be severely hypervolemic   - BNP 2749,  CXR shows new pulmonary congestion  - CTA is neg for PE  - troponins neg x2, EKG shows NSR no ST elevations or TWI  - TTE with poor windows due to body habitus, but showing severe global LV dysfunction   - LHC with mild atherosclerosis in Cx and LAD and mod-severe disease of RCA - findings out of proportion to advanced CHF  - BLE dopplers negative   - c/w IV lasix 40mg BID - suspect can transition to po later today or brianna - f.u cards   - GDMT: metoprolol succinate inc to 75mg daily, started Entresto   - out-pt AICD eval per cards   - cardiology consulted appreciate recs  - Unable to obtain inpatient cardiac MRI *weight limit is 350lbs, will need to be done as outpatient   - daily weights, strict I/Os, fluid restrict 1L/day, DASH diet, nutrition consult

## 2022-11-22 NOTE — PROGRESS NOTE ADULT - SUBJECTIVE AND OBJECTIVE BOX
Patient is a 46y old  Male who presents with a chief complaint of shortness of breath (21 Nov 2022 12:48)      SUBJECTIVE / OVERNIGHT EVENTS: no events - feels better     ROS:  No HA/DZ  No Vision changes   No CP  No N/V/D  No Edema  No Rash  NO weakness, numbness    MEDICATIONS  (STANDING):  aspirin  chewable 81 milliGRAM(s) Oral daily  atorvastatin 80 milliGRAM(s) Oral at bedtime  enoxaparin Injectable 60 milliGRAM(s) SubCutaneous every 12 hours  furosemide   Injectable 40 milliGRAM(s) IV Push two times a day  influenza   Vaccine 0.5 milliLiter(s) IntraMuscular once  metoprolol succinate ER 75 milliGRAM(s) Oral daily  multivitamin 1 Tablet(s) Oral daily  sacubitril 24 mG/valsartan 26 mG 1 Tablet(s) Oral two times a day    MEDICATIONS  (PRN):  acetaminophen     Tablet .. 650 milliGRAM(s) Oral every 6 hours PRN Temp greater or equal to 38C (100.4F), Mild Pain (1 - 3)  melatonin 3 milliGRAM(s) Oral at bedtime PRN Insomnia      T(C): 36.4 (11-22-22 @ 06:10)  HR: 65 (11-22-22 @ 06:25)  BP: 116/78 (11-22-22 @ 06:25)  RR: 17 (11-22-22 @ 06:25)  SpO2: 97% (11-22-22 @ 06:25)  CAPILLARY BLOOD GLUCOSE        I&O's Summary    21 Nov 2022 07:01  -  22 Nov 2022 07:00  --------------------------------------------------------  IN: 1700 mL / OUT: 1975 mL / NET: -275 mL        PHYSICAL EXAM:  GENERAL: NAD, obese , AOx3  HEAD:  Atraumatic, Normocephalic  EYES: EOMI, PERRL, conjunctiva and sclera clear  NECK: Supple, No JVD  CHEST/LUNG: Clear to auscultation bilaterally  HEART: Regular rate and rhythm; No murmurs, rubs, or gallops, +1-2 Edema  ABDOMEN: Soft, Nontender, Nondistended; Bowel sounds present  EXTREMITIES:  2+ Peripheral Pulses, No clubbing, cyanosis  PSYCH: No SI/HI  NEUROLOGY: non-focal  SKIN: No rashes or lesions    LABS:                        12.8   8.50  )-----------( 292      ( 22 Nov 2022 06:55 )             41.5     11-22    137  |  98  |  26<H>  ----------------------------<  101<H>  4.0   |  26  |  1.05    Ca    8.9      22 Nov 2022 06:55  Phos  3.5     11-22  Mg     2.60     11-22                    RADIOLOGY & ADDITIONAL TESTS:    Imaging Personally Reviewed:    Consultant(s) Notes Reviewed:      Care Discussed with Consultants/Other Providers:

## 2022-11-22 NOTE — PROGRESS NOTE ADULT - ATTENDING COMMENTS
46 year old man with NICM of unknown etiology who presents with acute on chronic systolic heart failure.  continue with diuresis with IV Lasix  On Toprol and Losartan with goal to transition to Entresto  cMRI pending
The patient was seen and examined with the Cardiology Consultation Teaching Service.     No overnight events    No chest pain  No dyspnea, orthopnea or PND  No palpitations or dizziness     Ambulating without significant dyspnea    PMH/PSH:  Hypertension  Dyslipidemia    Family history of coronary artery disease    Comfortable-appearing man in no acute distress  Alert and oriented  Afebrile  Vital signs stable  Minimal rales  Normal heart sounds  Soft, non-tender, non-distended abdomen  Extremities are warm and perfused  Bilateral peripheral edema unchanged    Stable borderline normocytic anemia  GFR 89    hs-troponin 23, 23 on admission  pro-BNP 2729 on admission    Echocardiography demonstrates severely reduced LV systolic function, no significant valve disease  Coronary angiography demonstrates a moderate-severe mid RCA lesion    Impression and Recommendations:  46-year-old man with hypertension, dyslipidemia and a strong family history of coronary disease who presented with dyspnea, found to have new cardiomyopathy.    Continue intravenous diuretics for net negative 1-2L daily.  Anticipate transition to PO diuretics tomorrow.  Continue metoprolol succinate.   Start sacubitril-valsartan.    Continue aspirin and high-potency statin for coronary disease.  Beta-blocker should be tolerated to maximum tolerated dose as an outpatient; target dose is 200mg daily.  Can consider cardiac MRI as part of a non-ischemic cardiomyopathy work-up, though this may be obtained in the outpatient setting.    Siva Alfaro MD  Cardiology  [phone]
The patient was seen and examined with the Cardiology Consultation Teaching Service.     No overnight events    No chest pain  No dyspnea, orthopnea or PND  No palpitations or dizziness     Breathing has significantly improved since admission    PMH/PSH:  Hypertension  Dyslipidemia    Family history of coronary artery disease    Comfortable-appearing man in no acute distress  Alert and oriented  Afebrile  Vital signs stable  JVP is not elevated  Bilateral basilar rales  Normal heart sounds  Soft, non-tender, non-distended abdomen  Extremities are warm and perfused  Bilateral peripheral edema     Stable borderline normocytic anemia  GFR 73    hs-troponin 23, 23 on admission  pro-BNP 2729 on admission    Echocardiography demonstrates severely reduced LV systolic function, no significant valve disease  Coronary angiography demonstrates a moderate-severe mid RCA lesion    Impression and Recommendations:  46-year-old man with hypertension, dyslipidemia and a strong family history of coronary disease who presented with dyspnea, found to have new cardiomyopathy.    Continue intravenous diuretics for net negative 1-2L daily.  Increase metoprolol succinate. Continue losartan. Consider starting ARNI if covered by insurance.   Continue aspirin and high-potency statin for coronary disease.    Can consider cardiac MRI as part of a non-ischemic cardiomyopathy work-up, though this may be obtained in the outpatient setting.    Siva Alfaro MD  Cardiology  [phone]
New systolic dysfunction and volume overload in patient with fam hx of premature CAD (uncle MI 60s, brother MI age 42)  Possible etiologies include ischemia, NICM, etoh-related cardiomyopathy, hx of COVID-19    -check [facility] on 11/17/2022  -continue furosemide 40mg IV BID  -consider cardiac MRI for further risk stratification, though can possible check as outpt  -increase metoprolol succinate to 50mg once daily  -cont losartan 25mg daily, anticipate transition to Entresto soon
New systolic dysfunction and volume overload in patient with fam hx of premature CAD (uncle MI 60s, brother MI age 42)  [facility] with CAD though his cardiomyopathy is out of proportion to his CAD burden    -cont metoprolol succinate 50mg daily  -cont losartan 25mg daily. anticipate transtion to Entresto soon  -continue furosemide 40mg IV BID  -consider cardiac MRI for further risk stratification, though can possible check as outpt

## 2022-11-22 NOTE — PROGRESS NOTE ADULT - SUBJECTIVE AND OBJECTIVE BOX
Monica Sánchez MD  Cardiology Fellow  [phone]  All Cardiology service information can be found 24/7 on [email or web address], password: [email or web address]    Patient seen and examined at bedside.    Overnight Events:   NAEON     Review Of Systems: No chest pain, shortness of breath, or palpitations            Current Meds:  acetaminophen     Tablet .. 650 milliGRAM(s) Oral every 6 hours PRN  aspirin  chewable 81 milliGRAM(s) Oral daily  atorvastatin 80 milliGRAM(s) Oral at bedtime  enoxaparin Injectable 60 milliGRAM(s) SubCutaneous every 12 hours  furosemide   Injectable 40 milliGRAM(s) IV Push two times a day  influenza   Vaccine 0.5 milliLiter(s) IntraMuscular once  melatonin 3 milliGRAM(s) Oral at bedtime PRN  metoprolol succinate ER 75 milliGRAM(s) Oral daily  multivitamin 1 Tablet(s) Oral daily  sacubitril 24 mG/valsartan 26 mG 1 Tablet(s) Oral two times a day      Vitals:  T(F): 97.5 (11-22), Max: 98 (11-21)  HR: 62 (11-22) (62 - 68)  BP: 125/87 (11-22) (116/78 - 136/90)  RR: 18 (11-22)  SpO2: 98% (11-22)  I&O's Summary    21 Nov 2022 07:01  -  22 Nov 2022 07:00  --------------------------------------------------------  IN: 2100 mL / OUT: 2475 mL / NET: -375 mL    22 Nov 2022 07:01  -  22 Nov 2022 13:14  --------------------------------------------------------  IN: 300 mL / OUT: 2000 mL / NET: -1700 mL        Physical Exam:  Appearance: No acute distress; well appearing  Eyes: PERRL, EOMI, pink conjunctiva  HENT: Normal oral mucosa  Cardiovascular: RRR, S1, S2, no murmurs, rubs, or gallops; 2+ pitting edema bilaterally  Respiratory: Bi-basilar crackles   Gastrointestinal: soft, non-tender, non-distended with normal bowel sounds  Musculoskeletal: No clubbing; no joint deformity   Neurologic: Non-focal  Lymphatic: No lymphadenopathy  Psychiatry: AAOx3, mood & affect appropriate  Skin: No rashes, ecchymoses, or                           12.8   8.50  )-----------( 292      ( 22 Nov 2022 06:55 )             41.5     11-22    137  |  98  |  26<H>  ----------------------------<  101<H>  4.0   |  26  |  1.05    Ca    8.9      22 Nov 2022 06:55  Phos  3.5     11-22  Mg     2.60     11-22        CARDIAC MARKERS ( 15 Nov 2022 14:00 )  23 ng/L / x     / x     / x     / x     / x

## 2022-11-22 NOTE — PROGRESS NOTE ADULT - PROBLEM SELECTOR PLAN 2
- previously on losartan-HCTZ, but reported not taking any medications recently   - reestablished care with PCP week PTA   - c/w metoprolol succinate and Entresto

## 2022-11-22 NOTE — PROGRESS NOTE ADULT - ASSESSMENT
46M with PMH of HTN, HLD, obesity, DESMOND, strong family hx of CAD, and prior COVID p/w new decompensated HFrEF with LHC showing CAD out of proportion to his CM. Possible stress induced CM in setting of COVID    #NICM   -Can transition to oral lasix for tomorrow   -C/w metoprolol 75 (hold for HR < 60)  -C/w atorvastatin   -C/w ASA   -C/w ARNI   -Would have patient follow up with a cardiologist within 7-10 days of discharge

## 2022-11-23 ENCOUNTER — TRANSCRIPTION ENCOUNTER (OUTPATIENT)
Age: 47
End: 2022-11-23

## 2022-11-23 VITALS
RESPIRATION RATE: 18 BRPM | OXYGEN SATURATION: 98 % | HEART RATE: 68 BPM | DIASTOLIC BLOOD PRESSURE: 61 MMHG | TEMPERATURE: 98 F | SYSTOLIC BLOOD PRESSURE: 132 MMHG

## 2022-11-23 LAB
ANION GAP SERPL CALC-SCNC: 13 MMOL/L — SIGNIFICANT CHANGE UP (ref 7–14)
BUN SERPL-MCNC: 30 MG/DL — HIGH (ref 7–23)
CALCIUM SERPL-MCNC: 9.2 MG/DL — SIGNIFICANT CHANGE UP (ref 8.4–10.5)
CHLORIDE SERPL-SCNC: 99 MMOL/L — SIGNIFICANT CHANGE UP (ref 98–107)
CO2 SERPL-SCNC: 24 MMOL/L — SIGNIFICANT CHANGE UP (ref 22–31)
CREAT SERPL-MCNC: 1.19 MG/DL — SIGNIFICANT CHANGE UP (ref 0.5–1.3)
EGFR: 76 ML/MIN/1.73M2 — SIGNIFICANT CHANGE UP
GLUCOSE SERPL-MCNC: 102 MG/DL — HIGH (ref 70–99)
HCT VFR BLD CALC: 44.4 % — SIGNIFICANT CHANGE UP (ref 39–50)
HGB BLD-MCNC: 13.6 G/DL — SIGNIFICANT CHANGE UP (ref 13–17)
MAGNESIUM SERPL-MCNC: 2.6 MG/DL — SIGNIFICANT CHANGE UP (ref 1.6–2.6)
MCHC RBC-ENTMCNC: 24.5 PG — LOW (ref 27–34)
MCHC RBC-ENTMCNC: 30.6 GM/DL — LOW (ref 32–36)
MCV RBC AUTO: 80.1 FL — SIGNIFICANT CHANGE UP (ref 80–100)
NRBC # BLD: 0 /100 WBCS — SIGNIFICANT CHANGE UP (ref 0–0)
NRBC # FLD: 0 K/UL — SIGNIFICANT CHANGE UP (ref 0–0)
PHOSPHATE SERPL-MCNC: 3.6 MG/DL — SIGNIFICANT CHANGE UP (ref 2.5–4.5)
PLATELET # BLD AUTO: 343 K/UL — SIGNIFICANT CHANGE UP (ref 150–400)
POTASSIUM SERPL-MCNC: 4.3 MMOL/L — SIGNIFICANT CHANGE UP (ref 3.5–5.3)
POTASSIUM SERPL-SCNC: 4.3 MMOL/L — SIGNIFICANT CHANGE UP (ref 3.5–5.3)
RBC # BLD: 5.54 M/UL — SIGNIFICANT CHANGE UP (ref 4.2–5.8)
RBC # FLD: 15.4 % — HIGH (ref 10.3–14.5)
SODIUM SERPL-SCNC: 136 MMOL/L — SIGNIFICANT CHANGE UP (ref 135–145)
WBC # BLD: 11.09 K/UL — HIGH (ref 3.8–10.5)
WBC # FLD AUTO: 11.09 K/UL — HIGH (ref 3.8–10.5)

## 2022-11-23 PROCEDURE — 99239 HOSP IP/OBS DSCHRG MGMT >30: CPT

## 2022-11-23 RX ORDER — METOPROLOL TARTRATE 50 MG
3 TABLET ORAL
Qty: 90 | Refills: 0
Start: 2022-11-23 | End: 2022-12-22

## 2022-11-23 RX ORDER — FUROSEMIDE 40 MG
1 TABLET ORAL
Qty: 60 | Refills: 0
Start: 2022-11-23 | End: 2022-12-22

## 2022-11-23 RX ORDER — ASPIRIN/CALCIUM CARB/MAGNESIUM 324 MG
1 TABLET ORAL
Qty: 30 | Refills: 0
Start: 2022-11-23 | End: 2022-12-22

## 2022-11-23 RX ORDER — SACUBITRIL AND VALSARTAN 24; 26 MG/1; MG/1
1 TABLET, FILM COATED ORAL
Qty: 60 | Refills: 0
Start: 2022-11-23 | End: 2022-12-22

## 2022-11-23 RX ORDER — ATORVASTATIN CALCIUM 80 MG/1
1 TABLET, FILM COATED ORAL
Qty: 30 | Refills: 0
Start: 2022-11-23 | End: 2022-12-22

## 2022-11-23 RX ADMIN — Medication 40 MILLIGRAM(S): at 17:07

## 2022-11-23 RX ADMIN — Medication 81 MILLIGRAM(S): at 17:07

## 2022-11-23 RX ADMIN — Medication 75 MILLIGRAM(S): at 05:21

## 2022-11-23 RX ADMIN — Medication 1 TABLET(S): at 17:07

## 2022-11-23 RX ADMIN — SACUBITRIL AND VALSARTAN 1 TABLET(S): 24; 26 TABLET, FILM COATED ORAL at 17:07

## 2022-11-23 RX ADMIN — ENOXAPARIN SODIUM 60 MILLIGRAM(S): 100 INJECTION SUBCUTANEOUS at 05:24

## 2022-11-23 RX ADMIN — Medication 40 MILLIGRAM(S): at 05:21

## 2022-11-23 RX ADMIN — SACUBITRIL AND VALSARTAN 1 TABLET(S): 24; 26 TABLET, FILM COATED ORAL at 05:22

## 2022-11-23 NOTE — PROGRESS NOTE ADULT - SUBJECTIVE AND OBJECTIVE BOX
Patient is a 46y old  Male who presents with a chief complaint of shortness of breath (22 Nov 2022 13:14)      SUBJECTIVE / OVERNIGHT EVENTS: no events, feels well, at baseline now     ROS:  No HA/DZ  No Vision changes   No CP, SOB  No N/V/D  No Edema  No Rash  NO weakness, numbness    MEDICATIONS  (STANDING):  aspirin  chewable 81 milliGRAM(s) Oral daily  atorvastatin 80 milliGRAM(s) Oral at bedtime  enoxaparin Injectable 60 milliGRAM(s) SubCutaneous every 12 hours  furosemide    Tablet 40 milliGRAM(s) Oral two times a day  influenza   Vaccine 0.5 milliLiter(s) IntraMuscular once  metoprolol succinate ER 75 milliGRAM(s) Oral daily  multivitamin 1 Tablet(s) Oral daily  sacubitril 24 mG/valsartan 26 mG 1 Tablet(s) Oral two times a day    MEDICATIONS  (PRN):  acetaminophen     Tablet .. 650 milliGRAM(s) Oral every 6 hours PRN Temp greater or equal to 38C (100.4F), Mild Pain (1 - 3)  melatonin 3 milliGRAM(s) Oral at bedtime PRN Insomnia      T(C): 36.4 (11-23-22 @ 05:00)  HR: 73 (11-23-22 @ 05:00)  BP: 119/- (11-23-22 @ 05:00)  RR: 18 (11-23-22 @ 05:00)  SpO2: 100% (11-23-22 @ 05:00)  CAPILLARY BLOOD GLUCOSE        I&O's Summary    22 Nov 2022 07:01  -  23 Nov 2022 07:00  --------------------------------------------------------  IN: 1050 mL / OUT: 3500 mL / NET: -2450 mL        PHYSICAL EXAM:  GENERAL: NAD, Obese, AOx3  HEAD:  Atraumatic, Normocephalic  EYES: EOMI, PERRL, conjunctiva and sclera clear  NECK: Supple, No JVD  CHEST/LUNG: Clear to auscultation bilaterally  HEART: Regular rate and rhythm; No murmurs, rubs, or gallops, Trace Edema  ABDOMEN: Soft, Nontender, Nondistended; Bowel sounds present  EXTREMITIES:  2+ Peripheral Pulses, No clubbing, cyanosis  PSYCH: No SI/HI  NEUROLOGY: non-focal  SKIN: No rashes or lesions    LABS:                        13.6   11.09 )-----------( 343      ( 23 Nov 2022 04:01 )             44.4     11-23    136  |  99  |  30<H>  ----------------------------<  102<H>  4.3   |  24  |  1.19    Ca    9.2      23 Nov 2022 04:01  Phos  3.6     11-23  Mg     2.60     11-23                    RADIOLOGY & ADDITIONAL TESTS:    Imaging Personally Reviewed:    Consultant(s) Notes Reviewed:      Care Discussed with Consultants/Other Providers:

## 2022-11-23 NOTE — PROGRESS NOTE ADULT - NUTRITIONAL ASSESSMENT
This patient has been assessed with a concern for Malnutrition and has been determined to have a diagnosis/diagnoses of Morbid obesity (BMI > 40).    This patient is being managed with:   Diet DASH/TLC-  Sodium & Cholesterol Restricted  Entered: Nov 19 2022  9:49AM    
This patient has been assessed with a concern for Malnutrition and has been determined to have a diagnosis/diagnoses of Morbid obesity (BMI > 40).    This patient is being managed with:   Diet Soft and Bite Sized-  DASH/TLC {Sodium & Cholesterol Restricted} (DASH)  1000mL Fluid Restriction ([id number])  Entered: Nov 15 2022 10:50PM    
This patient has been assessed with a concern for Malnutrition and has been determined to have a diagnosis/diagnoses of Morbid obesity (BMI > 40).    This patient is being managed with:   Diet DASH/TLC-  Sodium & Cholesterol Restricted  Entered: Nov 19 2022  9:49AM

## 2022-11-23 NOTE — DISCHARGE NOTE PROVIDER - DETAILS OF MALNUTRITION DIAGNOSIS/DIAGNOSES
This patient has been assessed with a concern for Malnutrition and was treated during this hospitalization for the following Nutrition diagnosis/diagnoses:     -  11/17/2022: Morbid obesity (BMI > 40)

## 2022-11-23 NOTE — DISCHARGE NOTE PROVIDER - HOSPITAL COURSE
46M with PMH of HTN, presented to the ED for worsening SOB, diagnosed acute CHF exacerbation with TTE showing severe global LV dysfunction.     # Acute systolic heart failure.   - Non-ischemic cardiomyopathy   - patient presented for worsening of shortness of breath with exertion, noted to be severely hypervolemic   - BNP 2749,  CXR shows new pulmonary congestion  - CTA is neg for PE  - troponins neg x2, EKG shows NSR no ST elevations or TWI  - TTE with poor windows due to body habitus, but showing severe global LV dysfunction   - LHC with mild atherosclerosis in Cx and LAD and mod-severe disease of RCA - findings out of proportion to advanced CHF  - BLE dopplers negative   - clinically improved, transition to oral lasix today   - GDMT: metoprolol succinate inc to 75mg daily, started Entresto   - out-pt AICD eval and no need for life vest per cards  - Unable to obtain inpatient cardiac MRI *weight limit is 350lbs, will need to be done as outpatient   - daily weights, strict I/Os, fluid restrict 1L/day, DASH diet, nutrition consult.    # Benign essential HTN.   ·- previously on losartan-HCTZ, but reported not taking any medications recently   - reestablished care with PCP week PTA   - c/w metoprolol succinate and Entresto  - controlled.    # HLD (hyperlipidemia).   ·  Plan: previously on rosuvastatin 20mg  - lipid panel with LDL of 141  - started atorvastatin 80mg, ASA 81mg.    # Mild alcohol use disorder.   ·  Plan: - advised to stop drinking  - no signs of acute withdrawal, calm appearing at bedside, no tremors or signs of agitation/anxiety  - monitoring off CIWA protocol at this time.    Patient seen and evaluated. Reviewed discharge medications with patient and attending. All new medications requiring new prescriptions were sent to the pharmacy of patient's choice. Reviewed need for prescription for previous home medications and new prescriptions sent if requested. Medically cleared/stable for discharge as per Dr. Lovell with appropriate follow up. Patient understands and agrees with plan of care.

## 2022-11-23 NOTE — DISCHARGE NOTE PROVIDER - NSDCCPCAREPLAN_GEN_ALL_CORE_FT
PRINCIPAL DISCHARGE DIAGNOSIS  Diagnosis: Shortness of breath  Assessment and Plan of Treatment: The recent episode of increase shortness was found to be cause by a weak heart muscle, please continue the prescribed medication to help improve heart function and strenght. Please follow up with [facility] clinic in 1-2 weeks.      SECONDARY DISCHARGE DIAGNOSES  Diagnosis: Mild alcohol use disorder  Assessment and Plan of Treatment: Please reduce alcohol intake, the effects of alcohol can weakness the strenght of heart function.    Diagnosis: HLD (hyperlipidemia)  Assessment and Plan of Treatment: Continue prescribed medications to control your cholesterol levels and a DASH (Low fat/salt) diet. Follow up with your primary care provider upon discharge for further management and monitoring of cholesterol levels.    Diagnosis: Benign essential HTN  Assessment and Plan of Treatment: Continue blood pressure medication regimen as directed. Monitor for any visual changes, headaches or dizziness.  Monitor blood pressure regularly.  Follow up with your primary care provider for further management for high blood pressure.    Diagnosis: Acute systolic heart failure  Assessment and Plan of Treatment:

## 2022-11-23 NOTE — CHART NOTE - NSCHARTNOTEFT_GEN_A_CORE
Patient seen by cardiology this AM and is stable for discharge. Please c/w Metoprolol, Entresto, Can have patient on oral furosemide 60 qd if once a day medication needed. Please ensure patient follows up with cardiology (okay with coming to [facility] clinic) within next 7-10 days to further discuss need of cMRI and up-titration of GDMT.

## 2022-11-23 NOTE — DISCHARGE NOTE PROVIDER - NSDCMRMEDTOKEN_GEN_ALL_CORE_FT
aspirin 81 mg oral tablet, chewable: 1 tab(s) orally once a day  atorvastatin 80 mg oral tablet: 1 tab(s) orally once a day (at bedtime)  furosemide 40 mg oral tablet: 1 tab(s) orally 2 times a day  metoprolol succinate 25 mg oral tablet, extended release: 3 tab(s) orally once a day  Multiple Vitamins oral tablet: 1 tab(s) orally once a day  sacubitril-valsartan 24 mg-26 mg oral tablet: 1 tab(s) orally 2 times a day

## 2022-11-23 NOTE — PROGRESS NOTE ADULT - PROBLEM/PLAN-4
Preceptor Attestation:    I talked to the patient on the phone and discussed the patient with the resident. I have verified the content of the note, which accurately reflects my assessment of the patient and the plan of care.   Supervising Physician:  Parish Núñez MD.                        
DISPLAY PLAN FREE TEXT

## 2022-11-23 NOTE — DISCHARGE NOTE PROVIDER - NSFOLLOWUPCLINICS_GEN_ALL_ED_FT
[facility]  Cardiology  [address] [place] [id number]  Phone: [phone]  Fax:

## 2022-11-23 NOTE — PROGRESS NOTE ADULT - PROVIDER SPECIALTY LIST ADULT
Cardiology
Hospitalist
Cardiology
Hospitalist
Cardiology
Hospitalist

## 2022-11-23 NOTE — PROGRESS NOTE ADULT - PROBLEM SELECTOR PLAN 1
# Non-ischemic cardiomyopathy   - patient presented for worsening of shortness of breath with exertion, noted to be severely hypervolemic   - BNP 2749,  CXR shows new pulmonary congestion  - CTA is neg for PE  - troponins neg x2, EKG shows NSR no ST elevations or TWI  - TTE with poor windows due to body habitus, but showing severe global LV dysfunction   - LHC with mild atherosclerosis in Cx and LAD and mod-severe disease of RCA - findings out of proportion to advanced CHF  - BLE dopplers negative   - clinically improved, transition to oral lasix today   - GDMT: metoprolol succinate inc to 75mg daily, started Entresto   - out-pt AICD eval per cards   - cardiology consulted appreciate recs  - Unable to obtain inpatient cardiac MRI *weight limit is 350lbs, will need to be done as outpatient   - daily weights, strict I/Os, fluid restrict 1L/day, DASH diet, nutrition consult # Non-ischemic cardiomyopathy   - patient presented for worsening of shortness of breath with exertion, noted to be severely hypervolemic   - BNP 2749,  CXR shows new pulmonary congestion  - CTA is neg for PE  - troponins neg x2, EKG shows NSR no ST elevations or TWI  - TTE with poor windows due to body habitus, but showing severe global LV dysfunction   - LHC with mild atherosclerosis in Cx and LAD and mod-severe disease of RCA - findings out of proportion to advanced CHF  - BLE dopplers negative   - clinically improved, transition to oral lasix today   - GDMT: metoprolol succinate inc to 75mg daily, started Entresto   - out-pt AICD eval and no need for life vest per cards  - cardiology consulted appreciate recs  - Unable to obtain inpatient cardiac MRI *weight limit is 350lbs, will need to be done as outpatient   - daily weights, strict I/Os, fluid restrict 1L/day, DASH diet, nutrition consult

## 2022-11-23 NOTE — PROGRESS NOTE ADULT - PROBLEM SELECTOR PLAN 6
DVTppx: LMWH restart at 60 BID after cath (dose per pharmacy for his BMI)  Diet: DASH, fluid restrict  Dispo: walking around unit without difficulty, likely back home once more euvolemic
DVTppx: LMWH restart at 60 BID after cath (dose per pharmacy for his BMI)  Diet: DASH, fluid restrict  Dispo: dc planning home today  Time spent 35 min
DVTppx: LMWH restart at 60 BID after cath (dose per pharmacy for his BMI)  Diet: DASH, fluid restrict  Dispo: walking around unit without difficulty, likely back home in 1-2 days.     Discussed with ACP Adelaide
DVTppx: LMWH restart at 60 BID after cath (dose per pharmacy for his BMI)  Diet: DASH, fluid restrict  Dispo: walking around unit without difficulty, likely back home once more euvolemic    Discussed with ACP Delaney
DVTppx: LMWH restart at 60 BID after cath (dose per pharmacy for his BMI)  Diet: DASH, fluid restrict  Dispo: walking around unit without difficulty, likely back home in 1-2 days.
DVTppx: LMWH restart at 60 BID after cath (dose per pharmacy for his BMI)  Diet: DASH, fluid restrict  Dispo: walking around unit without difficulty, likely back home in 1-2 days.
DVTppx: LMWH restart at 60 BID after cath (dose per pharmacy for his BMI)  Diet: NPO pending cath  Dispo: walking around unit without difficulty, likely back home

## 2022-11-23 NOTE — PROGRESS NOTE ADULT - PROBLEM SELECTOR PLAN 2
- previously on losartan-HCTZ, but reported not taking any medications recently   - reestablished care with PCP week PTA   - c/w metoprolol succinate and Entresto  - controlled

## 2022-11-23 NOTE — PROGRESS NOTE ADULT - PROBLEM SELECTOR PROBLEM 1
Acute systolic heart failure

## 2022-12-13 PROBLEM — Z00.00 ENCOUNTER FOR PREVENTIVE HEALTH EXAMINATION: Status: ACTIVE | Noted: 2022-12-13

## 2022-12-14 DIAGNOSIS — Z86.79 PERSONAL HISTORY OF OTHER DISEASES OF THE CIRCULATORY SYSTEM: ICD-10-CM

## 2022-12-14 DIAGNOSIS — E66.01 MORBID (SEVERE) OBESITY DUE TO EXCESS CALORIES: ICD-10-CM

## 2022-12-14 RX ORDER — MULTIVITAMIN
TABLET ORAL
Refills: 0 | Status: ACTIVE | COMMUNITY

## 2022-12-15 ENCOUNTER — NON-APPOINTMENT (OUTPATIENT)
Age: 47
End: 2022-12-15

## 2022-12-15 ENCOUNTER — APPOINTMENT (OUTPATIENT)
Dept: CARDIOLOGY | Facility: CLINIC | Age: 47
End: 2022-12-15

## 2022-12-15 VITALS
HEART RATE: 58 BPM | BODY MASS INDEX: 41.75 KG/M2 | SYSTOLIC BLOOD PRESSURE: 114 MMHG | DIASTOLIC BLOOD PRESSURE: 75 MMHG | OXYGEN SATURATION: 100 % | HEIGHT: 73 IN | WEIGHT: 315 LBS

## 2022-12-15 DIAGNOSIS — Z78.9 OTHER SPECIFIED HEALTH STATUS: ICD-10-CM

## 2022-12-15 DIAGNOSIS — Z87.891 PERSONAL HISTORY OF NICOTINE DEPENDENCE: ICD-10-CM

## 2022-12-15 DIAGNOSIS — Z80.0 FAMILY HISTORY OF MALIGNANT NEOPLASM OF DIGESTIVE ORGANS: ICD-10-CM

## 2022-12-15 PROCEDURE — 99215 OFFICE O/P EST HI 40 MIN: CPT | Mod: 25

## 2022-12-15 PROCEDURE — 93000 ELECTROCARDIOGRAM COMPLETE: CPT

## 2022-12-16 LAB
ANION GAP SERPL CALC-SCNC: 14 MMOL/L
BUN SERPL-MCNC: 17 MG/DL
CALCIUM SERPL-MCNC: 9.7 MG/DL
CHLORIDE SERPL-SCNC: 101 MMOL/L
CO2 SERPL-SCNC: 28 MMOL/L
CREAT SERPL-MCNC: 1.24 MG/DL
EGFR: 72 ML/MIN/1.73M2
GLUCOSE SERPL-MCNC: 102 MG/DL
NT-PROBNP SERPL-MCNC: 407 PG/ML
POTASSIUM SERPL-SCNC: 4.4 MMOL/L
SODIUM SERPL-SCNC: 143 MMOL/L

## 2022-12-18 NOTE — REVIEW OF SYSTEMS
[SOB] : shortness of breath [Dyspnea on exertion] : dyspnea during exertion [Chest Discomfort] : no chest discomfort [Negative] : Heme/Lymph

## 2022-12-18 NOTE — DISCUSSION/SUMMARY
[FreeTextEntry1] : The patient is a 47-year-old gentleman prior ETOH, HLD, HTN, CAD s/p acute systolic heart failure who is improving. \par #1 Cv- moderate RCA disease, c/w aspirin, needs ECHO at 3 months\par #2 HFrEF- check labs, if ok then increase Entresto to 49/51, lasix 40mg bid, \par #3 EP- currently on toprol 75mg may need to adjust\par #4 HLD- c/w atorvastatin, check labs 2-3 months\par #5 General- not drinking, gradually increase physical activity, weight loss encouraged.  [EKG obtained to assist in diagnosis and management of assessed problem(s)] : EKG obtained to assist in diagnosis and management of assessed problem(s)

## 2022-12-18 NOTE — HISTORY OF PRESENT ILLNESS
[FreeTextEntry1] : Marko is a 47-year-old gentleman s/p hospitalization for new heart failure. s/p cath mild disease. He is feeling much better. ? ETOH abuse in past. Compliant with all meds.

## 2023-01-12 ENCOUNTER — APPOINTMENT (OUTPATIENT)
Dept: CARDIOLOGY | Facility: CLINIC | Age: 48
End: 2023-01-12
Payer: COMMERCIAL

## 2023-01-12 ENCOUNTER — NON-APPOINTMENT (OUTPATIENT)
Age: 48
End: 2023-01-12

## 2023-01-12 VITALS
HEIGHT: 73 IN | OXYGEN SATURATION: 97 % | DIASTOLIC BLOOD PRESSURE: 70 MMHG | BODY MASS INDEX: 41.75 KG/M2 | SYSTOLIC BLOOD PRESSURE: 118 MMHG | WEIGHT: 315 LBS | HEART RATE: 64 BPM

## 2023-01-12 PROCEDURE — 99214 OFFICE O/P EST MOD 30 MIN: CPT | Mod: 25

## 2023-01-12 PROCEDURE — 93000 ELECTROCARDIOGRAM COMPLETE: CPT

## 2023-01-12 NOTE — REVIEW OF SYSTEMS
[Weight Loss (___ Lbs)] : [unfilled] ~Ulb weight loss [SOB] : shortness of breath [Dyspnea on exertion] : dyspnea during exertion [Chest Discomfort] : no chest discomfort [Negative] : Heme/Lymph

## 2023-01-12 NOTE — DISCUSSION/SUMMARY
[FreeTextEntry1] : The patient is a 47-year-old gentleman prior ETOH, HLD, HTN, CAD s/p acute systolic heart failure who is improving. \par #1 Cv- moderate RCA disease, c/w aspirin, needs ECHO after 2/16\par #2 HFrEF- check labs, if ok then increase Entresto to 49/51, lasix 40mg bid,add spironolactone and possibly decrease lasix. \par #3 EP- currently on toprol 75mg \par #4 HLD- c/w atorvastatin, check labs 2-3 months\par #5 General- not drinking, gradually increase physical activity, weight loss encouraged.  [EKG obtained to assist in diagnosis and management of assessed problem(s)] : EKG obtained to assist in diagnosis and management of assessed problem(s)

## 2023-02-21 ENCOUNTER — APPOINTMENT (OUTPATIENT)
Dept: CV DIAGNOSITCS | Facility: HOSPITAL | Age: 48
End: 2023-02-21

## 2023-02-21 ENCOUNTER — APPOINTMENT (OUTPATIENT)
Dept: CARDIOLOGY | Facility: CLINIC | Age: 48
End: 2023-02-21
Payer: COMMERCIAL

## 2023-02-21 VITALS
WEIGHT: 315 LBS | SYSTOLIC BLOOD PRESSURE: 128 MMHG | OXYGEN SATURATION: 99 % | HEART RATE: 60 BPM | BODY MASS INDEX: 41.75 KG/M2 | HEIGHT: 73 IN | DIASTOLIC BLOOD PRESSURE: 79 MMHG

## 2023-02-21 DIAGNOSIS — I10 ESSENTIAL (PRIMARY) HYPERTENSION: ICD-10-CM

## 2023-02-21 PROCEDURE — 93000 ELECTROCARDIOGRAM COMPLETE: CPT

## 2023-02-21 PROCEDURE — 99214 OFFICE O/P EST MOD 30 MIN: CPT | Mod: 25

## 2023-02-21 RX ORDER — FLUTICASONE PROPIONATE 50 UG/1
50 SPRAY, METERED NASAL
Qty: 16 | Refills: 0 | Status: DISCONTINUED | COMMUNITY
Start: 2022-09-21

## 2023-02-21 RX ORDER — PREDNISONE 10 MG/1
10 TABLET ORAL
Qty: 10 | Refills: 0 | Status: DISCONTINUED | COMMUNITY
Start: 2022-10-18

## 2023-02-21 RX ORDER — ASPIRIN 81 MG/1
81 TABLET, CHEWABLE ORAL
Qty: 30 | Refills: 0 | Status: DISCONTINUED | COMMUNITY
Start: 2022-11-23

## 2023-02-21 RX ORDER — SACUBITRIL AND VALSARTAN 24; 26 MG/1; MG/1
24-26 TABLET, FILM COATED ORAL
Qty: 180 | Refills: 0 | Status: DISCONTINUED | COMMUNITY
Start: 2022-12-15

## 2023-02-21 RX ORDER — CETIRIZINE HYDROCHLORIDE 10 MG/1
10 TABLET, COATED ORAL
Qty: 30 | Refills: 0 | Status: DISCONTINUED | COMMUNITY
Start: 2022-09-21

## 2023-02-22 PROBLEM — I10 ESSENTIAL HYPERTENSION: Status: ACTIVE | Noted: 2022-12-14

## 2023-02-22 LAB
ANION GAP SERPL CALC-SCNC: 14 MMOL/L
BUN SERPL-MCNC: 10 MG/DL
CALCIUM SERPL-MCNC: 9 MG/DL
CHLORIDE SERPL-SCNC: 102 MMOL/L
CO2 SERPL-SCNC: 25 MMOL/L
CREAT SERPL-MCNC: 1.04 MG/DL
EGFR: 89 ML/MIN/1.73M2
GLUCOSE SERPL-MCNC: 87 MG/DL
NT-PROBNP SERPL-MCNC: 340 PG/ML
POTASSIUM SERPL-SCNC: 4.2 MMOL/L
SODIUM SERPL-SCNC: 141 MMOL/L

## 2023-02-22 NOTE — DISCUSSION/SUMMARY
[FreeTextEntry1] : The patient is a 47-year-old gentleman prior ETOH, HLD, HTN, CAD s/p acute systolic heart failure who continues to improve\par #1 Cv- moderate RCA disease, c/w aspirin, echo today\par #2 HFrEF- check labs,c/w  Entresto to 49/51, lasix 40mg bid,spironolactone and possibly decrease lasix. \par #3 EP- currently on toprol 75mg \par #4 HLD- c/w atorvastatin, check labs 2-3 months\par #5 General- not drinking, gradually increase physical activity, weight loss encouraged.  [EKG obtained to assist in diagnosis and management of assessed problem(s)] : EKG obtained to assist in diagnosis and management of assessed problem(s)

## 2023-02-22 NOTE — REVIEW OF SYSTEMS
[Weight Loss (___ Lbs)] : [unfilled] ~Ulb weight loss [SOB] : shortness of breath [Dyspnea on exertion] : dyspnea during exertion [Negative] : Heme/Lymph [Chest Discomfort] : no chest discomfort

## 2023-02-22 NOTE — HISTORY OF PRESENT ILLNESS
[FreeTextEntry1] : Marko has been feeling well tolerating all his medications without any episodes of chest pain, lightheadedness, dizziness or shortness of breath.

## 2023-05-23 ENCOUNTER — NON-APPOINTMENT (OUTPATIENT)
Age: 48
End: 2023-05-23

## 2023-05-23 ENCOUNTER — APPOINTMENT (OUTPATIENT)
Dept: CARDIOLOGY | Facility: CLINIC | Age: 48
End: 2023-05-23
Payer: MEDICAID

## 2023-05-23 VITALS
DIASTOLIC BLOOD PRESSURE: 71 MMHG | HEIGHT: 73 IN | OXYGEN SATURATION: 98 % | WEIGHT: 315 LBS | BODY MASS INDEX: 41.75 KG/M2 | SYSTOLIC BLOOD PRESSURE: 103 MMHG | HEART RATE: 72 BPM

## 2023-05-23 DIAGNOSIS — I42.8 OTHER CARDIOMYOPATHIES: ICD-10-CM

## 2023-05-23 DIAGNOSIS — E78.5 HYPERLIPIDEMIA, UNSPECIFIED: ICD-10-CM

## 2023-05-23 PROCEDURE — 93000 ELECTROCARDIOGRAM COMPLETE: CPT

## 2023-05-23 PROCEDURE — 99214 OFFICE O/P EST MOD 30 MIN: CPT

## 2023-05-23 NOTE — HISTORY OF PRESENT ILLNESS
[FreeTextEntry1] : Marko never made his echo appointment. No CP, palpitations or SOB. Ambulates with cane.

## 2023-05-23 NOTE — DISCUSSION/SUMMARY
[FreeTextEntry1] : The patient is a 47-year-old gentleman prior ETOH, HLD, HTN, CAD, HFrEF who continues to improve\par #1 Cv- moderate RCA disease, c/w aspirin, echo reordered, may need ICD\par #2 HFrEF- check labs,c/w  Entresto to 49/51, lasix 40mg bid,spironolactone and possibly decrease lasix. \par #3 EP- currently on toprol 75mg \par #4 HLD- c/w atorvastatin, check labs 2-3 months\par #5 General- not drinking, gradually increase physical activity, weight loss encouraged.  [EKG obtained to assist in diagnosis and management of assessed problem(s)] : EKG obtained to assist in diagnosis and management of assessed problem(s)

## 2023-06-05 ENCOUNTER — RX RENEWAL (OUTPATIENT)
Age: 48
End: 2023-06-05

## 2023-06-06 LAB
25(OH)D3 SERPL-MCNC: 18.7 NG/ML
ALBUMIN SERPL ELPH-MCNC: 4.1 G/DL
ALP BLD-CCNC: 152 U/L
ALT SERPL-CCNC: 13 U/L
ANION GAP SERPL CALC-SCNC: 13 MMOL/L
AST SERPL-CCNC: 16 U/L
BILIRUB SERPL-MCNC: 1 MG/DL
BUN SERPL-MCNC: 15 MG/DL
CALCIUM SERPL-MCNC: 9.3 MG/DL
CHLORIDE SERPL-SCNC: 103 MMOL/L
CHOLEST SERPL-MCNC: 167 MG/DL
CO2 SERPL-SCNC: 24 MMOL/L
CREAT SERPL-MCNC: 1.02 MG/DL
EGFR: 91 ML/MIN/1.73M2
ESTIMATED AVERAGE GLUCOSE: 128 MG/DL
GLUCOSE SERPL-MCNC: 92 MG/DL
HBA1C MFR BLD HPLC: 6.1 %
HDLC SERPL-MCNC: 35 MG/DL
LDLC SERPL CALC-MCNC: 92 MG/DL
NONHDLC SERPL-MCNC: 132 MG/DL
NT-PROBNP SERPL-MCNC: 356 PG/ML
POTASSIUM SERPL-SCNC: 4.6 MMOL/L
PROT SERPL-MCNC: 7.2 G/DL
SODIUM SERPL-SCNC: 141 MMOL/L
TRIGL SERPL-MCNC: 203 MG/DL

## 2023-06-06 RX ORDER — ASPIRIN ENTERIC COATED TABLETS 81 MG 81 MG/1
81 TABLET, DELAYED RELEASE ORAL
Qty: 90 | Refills: 1 | Status: DISCONTINUED | COMMUNITY
End: 2023-06-06

## 2023-07-06 ENCOUNTER — RX RENEWAL (OUTPATIENT)
Age: 48
End: 2023-07-06

## 2023-12-07 ENCOUNTER — APPOINTMENT (OUTPATIENT)
Dept: CARDIOLOGY | Facility: CLINIC | Age: 48
End: 2023-12-07

## 2023-12-08 ENCOUNTER — OUTPATIENT (OUTPATIENT)
Dept: OUTPATIENT SERVICES | Facility: HOSPITAL | Age: 48
LOS: 1 days | End: 2023-12-08
Payer: MEDICAID

## 2023-12-08 ENCOUNTER — APPOINTMENT (OUTPATIENT)
Dept: CV DIAGNOSITCS | Facility: HOSPITAL | Age: 48
End: 2023-12-08

## 2023-12-08 DIAGNOSIS — Z98.84 BARIATRIC SURGERY STATUS: Chronic | ICD-10-CM

## 2023-12-08 DIAGNOSIS — I25.10 ATHEROSCLEROTIC HEART DISEASE OF NATIVE CORONARY ARTERY WITHOUT ANGINA PECTORIS: ICD-10-CM

## 2023-12-08 PROCEDURE — 93306 TTE W/DOPPLER COMPLETE: CPT | Mod: 26

## 2023-12-08 PROCEDURE — C8929: CPT

## 2023-12-14 ENCOUNTER — NON-APPOINTMENT (OUTPATIENT)
Age: 48
End: 2023-12-14

## 2023-12-14 ENCOUNTER — APPOINTMENT (OUTPATIENT)
Dept: CARDIOLOGY | Facility: CLINIC | Age: 48
End: 2023-12-14
Payer: MEDICAID

## 2023-12-14 VITALS
BODY MASS INDEX: 41.75 KG/M2 | OXYGEN SATURATION: 96 % | HEART RATE: 76 BPM | HEIGHT: 73 IN | DIASTOLIC BLOOD PRESSURE: 82 MMHG | WEIGHT: 315 LBS | SYSTOLIC BLOOD PRESSURE: 126 MMHG

## 2023-12-14 DIAGNOSIS — I51.9 HEART DISEASE, UNSPECIFIED: ICD-10-CM

## 2023-12-14 DIAGNOSIS — E87.1 HYPO-OSMOLALITY AND HYPONATREMIA: ICD-10-CM

## 2023-12-14 DIAGNOSIS — E83.51 HYPOCALCEMIA: ICD-10-CM

## 2023-12-14 LAB
25(OH)D3 SERPL-MCNC: 10 NG/ML
ALBUMIN SERPL ELPH-MCNC: 3.8 G/DL
ALP BLD-CCNC: 113 U/L
ALT SERPL-CCNC: 15 U/L
ANION GAP SERPL CALC-SCNC: 14 MMOL/L
AST SERPL-CCNC: 14 U/L
BILIRUB SERPL-MCNC: 0.8 MG/DL
BUN SERPL-MCNC: 12 MG/DL
CALCIUM SERPL-MCNC: 7.8 MG/DL
CHLORIDE SERPL-SCNC: 89 MMOL/L
CHOLEST SERPL-MCNC: 153 MG/DL
CO2 SERPL-SCNC: 21 MMOL/L
CREAT SERPL-MCNC: 0.86 MG/DL
EGFR: 107 ML/MIN/1.73M2
ESTIMATED AVERAGE GLUCOSE: 117 MG/DL
GLUCOSE SERPL-MCNC: 91 MG/DL
HBA1C MFR BLD HPLC: 5.7 %
HCT VFR BLD CALC: 39 %
HDLC SERPL-MCNC: 29 MG/DL
HGB BLD-MCNC: 12.6 G/DL
LDLC SERPL CALC-MCNC: 95 MG/DL
MCHC RBC-ENTMCNC: 27.8 PG
MCHC RBC-ENTMCNC: 32.3 GM/DL
MCV RBC AUTO: 86.1 FL
NONHDLC SERPL-MCNC: 124 MG/DL
NT-PROBNP SERPL-MCNC: 131 PG/ML
PLATELET # BLD AUTO: 247 K/UL
POTASSIUM SERPL-SCNC: 3.9 MMOL/L
PROT SERPL-MCNC: 6.6 G/DL
RBC # BLD: 4.53 M/UL
RBC # FLD: 14.4 %
SODIUM SERPL-SCNC: 124 MMOL/L
TRIGL SERPL-MCNC: 165 MG/DL
WBC # FLD AUTO: 8.31 K/UL

## 2023-12-14 PROCEDURE — 93000 ELECTROCARDIOGRAM COMPLETE: CPT

## 2023-12-14 PROCEDURE — 99214 OFFICE O/P EST MOD 30 MIN: CPT | Mod: 25

## 2023-12-14 NOTE — HISTORY OF PRESENT ILLNESS
[FreeTextEntry1] : Marko is feeling well with improvement in symptoms. Shoulder pain Surgery: Right shoulder rotator cuff surgery Date: 12/29/23 Surgeon: Dr. Gaston

## 2023-12-14 NOTE — DISCUSSION/SUMMARY
[FreeTextEntry1] : The patient is a 48-year-old gentleman prior ETOH, HLD, HTN, CAD, HFrEF who has improved but gained weight. #1 Cv- moderate RCA disease, c/w aspirin, echo marked improvement to 54% #2 HFrEF- c/w Entresto to 49/51, lasix 40mg daily, spironolactone    #3 EP- c/w toprol 75mg  #4 HLD- c/w atorvastatin, check labs  #5 General- not drinking, gradually increase physical activity, weight loss encouraged.  There are no cardiac contraindications to right rotator cuff surgery off [facility] three days before.  [EKG obtained to assist in diagnosis and management of assessed problem(s)] : EKG obtained to assist in diagnosis and management of assessed problem(s)

## 2023-12-14 NOTE — DISCUSSION/SUMMARY
[FreeTextEntry1] : The patient is a 48-year-old gentleman prior ETOH, HLD, HTN, CAD, HFrEF who has improved but gained weight. #1 Cv- moderate RCA disease, c/w aspirin, echo marked improvement to 54% #2 HFrEF- c/w Entresto to 49/51, lasix 40mg daily, spironolactone    #3 EP- c/w toprol 75mg  #4 HLD- c/w atorvastatin, check labs  #5 General- not drinking, gradually increase physical activity, weight loss encouraged.  There are no cardiac contraindications to right rotator cuff surgery off [facility] three days before.  [EKG obtained to assist in diagnosis and management of assessed problem(s)] : EKG obtained to assist in diagnosis and management of assessed problem(s)

## 2023-12-24 LAB
ANION GAP SERPL CALC-SCNC: 12 MMOL/L
BUN SERPL-MCNC: 9 MG/DL
CALCIUM SERPL-MCNC: 8.8 MG/DL
CHLORIDE SERPL-SCNC: 105 MMOL/L
CO2 SERPL-SCNC: 24 MMOL/L
CREAT SERPL-MCNC: 0.87 MG/DL
EGFR: 106 ML/MIN/1.73M2
GLUCOSE SERPL-MCNC: 93 MG/DL
NT-PROBNP SERPL-MCNC: 310 PG/ML
POTASSIUM SERPL-SCNC: 4.8 MMOL/L
SODIUM SERPL-SCNC: 141 MMOL/L

## 2024-02-14 ENCOUNTER — RX RENEWAL (OUTPATIENT)
Age: 49
End: 2024-02-14

## 2024-02-22 RX ORDER — ATORVASTATIN CALCIUM 80 MG/1
80 TABLET, FILM COATED ORAL
Qty: 90 | Refills: 1 | Status: ACTIVE | COMMUNITY
Start: 2023-06-05 | End: 1900-01-01

## 2024-02-22 RX ORDER — DAPAGLIFLOZIN 5 MG/1
5 TABLET, FILM COATED ORAL
Qty: 90 | Refills: 1 | Status: ACTIVE | COMMUNITY
Start: 2023-06-06 | End: 1900-01-01

## 2024-02-22 RX ORDER — SACUBITRIL AND VALSARTAN 49; 51 MG/1; MG/1
49-51 TABLET, FILM COATED ORAL
Qty: 180 | Refills: 1 | Status: ACTIVE | COMMUNITY
Start: 2023-06-05 | End: 1900-01-01

## 2024-02-22 RX ORDER — ASPIRIN 81 MG/1
81 TABLET, COATED ORAL
Qty: 90 | Refills: 1 | Status: ACTIVE | COMMUNITY
Start: 2023-06-05 | End: 1900-01-01

## 2024-03-29 RX ORDER — METOPROLOL SUCCINATE 25 MG/1
25 TABLET, EXTENDED RELEASE ORAL
Qty: 270 | Refills: 1 | Status: ACTIVE | COMMUNITY
Start: 2023-06-05 | End: 1900-01-01

## 2024-03-29 RX ORDER — SPIRONOLACTONE 25 MG/1
25 TABLET ORAL
Qty: 90 | Refills: 1 | Status: ACTIVE | COMMUNITY
Start: 2023-01-12 | End: 1900-01-01

## 2024-05-31 ENCOUNTER — RX RENEWAL (OUTPATIENT)
Age: 49
End: 2024-05-31

## 2024-06-10 ENCOUNTER — RX RENEWAL (OUTPATIENT)
Age: 49
End: 2024-06-10

## 2024-06-10 RX ORDER — CHOLECALCIFEROL (VITAMIN D3) 1250 MCG
1.25 MG CAPSULE ORAL WEEKLY
Qty: 9 | Refills: 0 | Status: ACTIVE | COMMUNITY
Start: 2023-12-14 | End: 1900-01-01

## 2024-06-27 ENCOUNTER — APPOINTMENT (OUTPATIENT)
Dept: CARDIOLOGY | Facility: CLINIC | Age: 49
End: 2024-06-27
Payer: MEDICAID

## 2024-06-27 ENCOUNTER — NON-APPOINTMENT (OUTPATIENT)
Age: 49
End: 2024-06-27

## 2024-06-27 VITALS
OXYGEN SATURATION: 98 % | WEIGHT: 315 LBS | HEART RATE: 59 BPM | DIASTOLIC BLOOD PRESSURE: 69 MMHG | HEIGHT: 73 IN | BODY MASS INDEX: 41.75 KG/M2 | SYSTOLIC BLOOD PRESSURE: 105 MMHG

## 2024-06-27 DIAGNOSIS — I25.10 ATHEROSCLEROTIC HEART DISEASE OF NATIVE CORONARY ARTERY W/OUT ANGINA PECTORIS: ICD-10-CM

## 2024-06-27 LAB
25(OH)D3 SERPL-MCNC: 50.8 NG/ML
ALBUMIN SERPL ELPH-MCNC: 4.2 G/DL
ALP BLD-CCNC: 130 U/L
ALT SERPL-CCNC: 15 U/L
ANION GAP SERPL CALC-SCNC: 12 MMOL/L
AST SERPL-CCNC: 18 U/L
BILIRUB SERPL-MCNC: 1.1 MG/DL
BUN SERPL-MCNC: 20 MG/DL
CALCIUM SERPL-MCNC: 9.6 MG/DL
CHLORIDE SERPL-SCNC: 102 MMOL/L
CHOLEST SERPL-MCNC: 131 MG/DL
CO2 SERPL-SCNC: 24 MMOL/L
CREAT SERPL-MCNC: 1.29 MG/DL
EGFR: 68 ML/MIN/1.73M2
ESTIMATED AVERAGE GLUCOSE: 123 MG/DL
GLUCOSE SERPL-MCNC: 112 MG/DL
HBA1C MFR BLD HPLC: 5.9 %
HCT VFR BLD CALC: 39.5 %
HDLC SERPL-MCNC: 26 MG/DL
HGB BLD-MCNC: 12.7 G/DL
LDLC SERPL CALC-MCNC: 78 MG/DL
MCHC RBC-ENTMCNC: 27.7 PG
MCHC RBC-ENTMCNC: 32.2 GM/DL
MCV RBC AUTO: 86.2 FL
NONHDLC SERPL-MCNC: 106 MG/DL
NT-PROBNP SERPL-MCNC: 143 PG/ML
PLATELET # BLD AUTO: 239 K/UL
POTASSIUM SERPL-SCNC: 4.9 MMOL/L
PROT SERPL-MCNC: 7.4 G/DL
RBC # BLD: 4.58 M/UL
RBC # FLD: 15.1 %
SODIUM SERPL-SCNC: 138 MMOL/L
TRIGL SERPL-MCNC: 156 MG/DL
WBC # FLD AUTO: 9.23 K/UL

## 2024-06-27 PROCEDURE — G2211 COMPLEX E/M VISIT ADD ON: CPT | Mod: NC,1L

## 2024-06-27 PROCEDURE — 99214 OFFICE O/P EST MOD 30 MIN: CPT | Mod: 25

## 2024-06-27 PROCEDURE — 93000 ELECTROCARDIOGRAM COMPLETE: CPT

## 2024-07-29 NOTE — PATIENT PROFILE ADULT - PUBLIC BENEFITS
Pharmacy change, please send for Doris      Patient requesting refill of Keppra 500 mg.      Medication active on med list yes      Date of last Rx: 11/13/2023 with 11 refills          verified by CORNELL, DARLENE      Date of last appointment 11/13/2023    Next Visit Date:  11/13/2024       no

## 2024-08-19 ENCOUNTER — RX RENEWAL (OUTPATIENT)
Age: 49
End: 2024-08-19

## 2024-08-27 ENCOUNTER — RX RENEWAL (OUTPATIENT)
Age: 49
End: 2024-08-27

## 2024-10-07 ENCOUNTER — RX RENEWAL (OUTPATIENT)
Age: 49
End: 2024-10-07

## 2024-10-28 ENCOUNTER — RX RENEWAL (OUTPATIENT)
Age: 49
End: 2024-10-28

## 2024-11-11 ENCOUNTER — RX RENEWAL (OUTPATIENT)
Age: 49
End: 2024-11-11

## 2025-01-02 ENCOUNTER — APPOINTMENT (OUTPATIENT)
Dept: CARDIOLOGY | Facility: CLINIC | Age: 50
End: 2025-01-02

## 2025-03-07 ENCOUNTER — RX RENEWAL (OUTPATIENT)
Age: 50
End: 2025-03-07

## 2025-05-07 NOTE — H&P ADULT - NSVTERISKREFERASSESS_GEN_ALL_CORE
Health Maintenance       COVID-19 Vaccine (4 - 2024-25 season)  Overdue since 9/1/2024    Pneumococcal Vaccine 50+ (1 of 1 - PCV)  Never done    Respiratory Syncytial Virus (RSV) Vaccine 60+ (1 - Risk 60-74 years 1-dose series)  Never done           Following review of the above:  Patient is not proceeding with: COVID-19, Pneumococcal, and Respiratory Syncytial Virus (RSV)    Note: Refer to final orders and clinician documentation.       Refer to the Assessment tab to view/cancel completed assessment.

## 2025-05-16 ENCOUNTER — RX RENEWAL (OUTPATIENT)
Age: 50
End: 2025-05-16

## 2025-05-27 ENCOUNTER — RX RENEWAL (OUTPATIENT)
Age: 50
End: 2025-05-27

## 2025-05-28 ENCOUNTER — RX RENEWAL (OUTPATIENT)
Age: 50
End: 2025-05-28